# Patient Record
Sex: MALE | Race: BLACK OR AFRICAN AMERICAN | NOT HISPANIC OR LATINO | Employment: FULL TIME | URBAN - METROPOLITAN AREA
[De-identification: names, ages, dates, MRNs, and addresses within clinical notes are randomized per-mention and may not be internally consistent; named-entity substitution may affect disease eponyms.]

---

## 2017-03-21 ENCOUNTER — TELEPHONE (OUTPATIENT)
Dept: OTHER | Facility: CLINIC | Age: 32
End: 2017-03-21

## 2017-03-21 NOTE — TELEPHONE ENCOUNTER
3/21/2017    Call Regarding Onboarding P1 [place]ador    Attempt 1    Message on voicemail     Comments: 0 dep      Outreach   samson

## 2019-05-20 ENCOUNTER — OFFICE VISIT (OUTPATIENT)
Dept: FAMILY MEDICINE | Facility: CLINIC | Age: 34
End: 2019-05-20
Payer: COMMERCIAL

## 2019-05-20 VITALS
OXYGEN SATURATION: 98 % | DIASTOLIC BLOOD PRESSURE: 70 MMHG | HEIGHT: 72 IN | WEIGHT: 247 LBS | HEART RATE: 72 BPM | TEMPERATURE: 98.3 F | BODY MASS INDEX: 33.46 KG/M2 | SYSTOLIC BLOOD PRESSURE: 122 MMHG

## 2019-05-20 DIAGNOSIS — J10.1 INFLUENZA A: ICD-10-CM

## 2019-05-20 DIAGNOSIS — Z11.3 SCREEN FOR STD (SEXUALLY TRANSMITTED DISEASE): ICD-10-CM

## 2019-05-20 DIAGNOSIS — R42 DIZZINESS: Primary | ICD-10-CM

## 2019-05-20 DIAGNOSIS — R53.83 OTHER FATIGUE: ICD-10-CM

## 2019-05-20 LAB
BASOPHILS # BLD AUTO: 0 10E9/L (ref 0–0.2)
BASOPHILS NFR BLD AUTO: 0.6 %
DIFFERENTIAL METHOD BLD: NORMAL
EOSINOPHIL # BLD AUTO: 0.2 10E9/L (ref 0–0.7)
EOSINOPHIL NFR BLD AUTO: 4.1 %
ERYTHROCYTE [DISTWIDTH] IN BLOOD BY AUTOMATED COUNT: 12.3 % (ref 10–15)
HCT VFR BLD AUTO: 42.6 % (ref 40–53)
HGB BLD-MCNC: 14.1 G/DL (ref 13.3–17.7)
LYMPHOCYTES # BLD AUTO: 1.9 10E9/L (ref 0.8–5.3)
LYMPHOCYTES NFR BLD AUTO: 37.8 %
MCH RBC QN AUTO: 27.9 PG (ref 26.5–33)
MCHC RBC AUTO-ENTMCNC: 33.1 G/DL (ref 31.5–36.5)
MCV RBC AUTO: 84 FL (ref 78–100)
MONOCYTES # BLD AUTO: 0.5 10E9/L (ref 0–1.3)
MONOCYTES NFR BLD AUTO: 9.2 %
NEUTROPHILS # BLD AUTO: 2.5 10E9/L (ref 1.6–8.3)
NEUTROPHILS NFR BLD AUTO: 48.3 %
PLATELET # BLD AUTO: 294 10E9/L (ref 150–450)
RBC # BLD AUTO: 5.06 10E12/L (ref 4.4–5.9)
WBC # BLD AUTO: 5.1 10E9/L (ref 4–11)

## 2019-05-20 PROCEDURE — 80053 COMPREHEN METABOLIC PANEL: CPT | Performed by: FAMILY MEDICINE

## 2019-05-20 PROCEDURE — 86780 TREPONEMA PALLIDUM: CPT | Performed by: FAMILY MEDICINE

## 2019-05-20 PROCEDURE — 87389 HIV-1 AG W/HIV-1&-2 AB AG IA: CPT | Performed by: FAMILY MEDICINE

## 2019-05-20 PROCEDURE — 99204 OFFICE O/P NEW MOD 45 MIN: CPT | Performed by: FAMILY MEDICINE

## 2019-05-20 PROCEDURE — 84443 ASSAY THYROID STIM HORMONE: CPT | Performed by: FAMILY MEDICINE

## 2019-05-20 PROCEDURE — 87491 CHLMYD TRACH DNA AMP PROBE: CPT | Performed by: FAMILY MEDICINE

## 2019-05-20 PROCEDURE — 85025 COMPLETE CBC W/AUTO DIFF WBC: CPT | Performed by: FAMILY MEDICINE

## 2019-05-20 PROCEDURE — 36415 COLL VENOUS BLD VENIPUNCTURE: CPT | Performed by: FAMILY MEDICINE

## 2019-05-20 PROCEDURE — 87591 N.GONORRHOEAE DNA AMP PROB: CPT | Performed by: FAMILY MEDICINE

## 2019-05-20 ASSESSMENT — MIFFLIN-ST. JEOR: SCORE: 2109.41

## 2019-05-20 NOTE — PROGRESS NOTES
Subjective     Shashi Yates is a 33 year old male who presents to clinic today for the following health issues:    Diabetes runs in family. Has not been tested in past.     HPI   Dizziness  Onset: weeks ago    Description:   Do you feel faint:  YES, sometimes, not all the times, sometimes he can manage it and it resolves on its own. He has needed to  Lean on a wall to support himself at times.   Does it feel like the surroundings (bed, room) are moving: no   Unsteady/off balance: YES, like hes walking on a boat  Have you passed out or fallen: no     Intensity: moderate    Progression of Symptoms:  worsening and waxing and waning, seemed to get worse after influenza A diagnosis    Accompanying Signs & Symptoms:  Heart palpitations: no   Nausea, vomiting: no   Weakness in arms or legs: no   Fatigue: YES  Vision or speech changes: YES- possibly some slurred speech, he is unsure though. No vision issues.   Ringing in ears (Tinnitus): no   Hearing Loss: no     History:   Head trauma/concussion hx: no, did play football in high school   Previous similar symptoms: no   Recent bleeding history: no     Precipitating factors:   Worse with activity or head movement: no   Any new medications (BP?): no   Alcohol/drug abuse/withdrawal: no     Alleviating factors:   Does staying in a fixed position give relief:  no     Cough and congestion-flu like symptoms     Influenza A -feeling a little bit better  Some cough  Some fatigue  Problems concentrating, slurred , fogginess, hard cont centration   No focal symptoms   No chest pain, no palpitation  No abdominal pain,   pre workouts for work outs-drinking enough    No long flights , no boat ride  Sometimes unsteady  Working at correctional facility  Not feeling like himself today    No excessive thurst  No   No drug, no smokes  No new meds  No new workouts      Patient Active Problem List   Diagnosis     Primary localized osteoarthrosis, lower leg     Iliotibial band syndrome      Lipscomb's disease     Knee pain     Left knee pain     Aftercare following surgery of the musculoskeletal system     Past Surgical History:   Procedure Laterality Date     ARTHROSCOPY KNEE  6/14/2013    Procedure: ARTHROSCOPY KNEE;  Left Knee arthroscopy, partial lateral menisectomy, Open Hardware Removal  ;  Surgeon: Tiburcio Winkler MD;  [facility]     ARTHROSCOPY KNEE Left 10/28/2015    Procedure: ARTHROSCOPY KNEE;  Surgeon: Tiburcio Winkler MD;  [facility]     KNEE SURGERY       ORTHOPEDIC SURGERY  L knee     OSTEOTOMY TIBIA Left 10/28/2015    Procedure: OSTEOTOMY TIBIA;  Surgeon: Tiburcio Winkler MD;  [facility]     REMOVE HARDWARE KNEE  6/14/2013    Procedure: REMOVE HARDWARE KNEE;;  Surgeon: Tiburcio Winkler MD;  [facility]       Social History     Tobacco Use     Smoking status: Never Smoker     Smokeless tobacco: Never Used   Substance Use Topics     Alcohol use: No     Family History   Problem Relation Age of Onset     Diabetes Father      Asthma Mother            Reviewed and updated as needed this visit by Provider         Review of Systems   ROS COMP: Constitutional, HEENT, cardiovascular, pulmonary, GI, , musculoskeletal, neuro, skin, endocrine and psych systems are negative, except as otherwise noted.      Objective    There were no vitals taken for this visit.  There is no height or weight on file to calculate BMI.  Physical Exam   GENERAL: healthy, alert and no distress  EYES: Eyes grossly normal to inspection, PERRL and conjunctivae and sclerae normal  HENT: ear canals and TM's normal, nose and mouth without ulcers or lesions  NECK: no adenopathy, no asymmetry, masses, or scars and thyroid normal to palpation  RESP: lungs clear to auscultation - no rales, rhonchi or wheezes  CV: regular rate and rhythm, normal S1 S2, no S3 or S4, no murmur, click or rub, no peripheral edema and peripheral pulses strong  ABDOMEN: soft, nontender, no hepatosplenomegaly, no  masses and bowel sounds normal  MS: no gross musculoskeletal defects noted, no edema  SKIN: no suspicious lesions or rashes  NEURO: Normal strength and tone, mentation intact and speech normal  PSYCH: mentation appears normal, affect normal/bright    Diagnostic Test Results:  Results for orders placed or performed in visit on 05/20/19 (from the past 24 hour(s))   CBC with platelets and differential   Result Value Ref Range    WBC 5.1 4.0 - 11.0 10e9/L    RBC Count 5.06 4.4 - 5.9 10e12/L    Hemoglobin 14.1 13.3 - 17.7 g/dL    Hematocrit 42.6 40.0 - 53.0 %    MCV 84 78 - 100 fl    MCH 27.9 26.5 - 33.0 pg    MCHC 33.1 31.5 - 36.5 g/dL    RDW 12.3 10.0 - 15.0 %    Platelet Count 294 150 - 450 10e9/L    % Neutrophils 48.3 %    % Lymphocytes 37.8 %    % Monocytes 9.2 %    % Eosinophils 4.1 %    % Basophils 0.6 %    Absolute Neutrophil 2.5 1.6 - 8.3 10e9/L    Absolute Lymphocytes 1.9 0.8 - 5.3 10e9/L    Absolute Monocytes 0.5 0.0 - 1.3 10e9/L    Absolute Eosinophils 0.2 0.0 - 0.7 10e9/L    Absolute Basophils 0.0 0.0 - 0.2 10e9/L    Diff Method Automated Method            Assessment & Plan       ICD-10-CM    1. Dizziness R42 CBC with platelets and differential     Comprehensive metabolic panel     TSH with free T4 reflex   2. Other fatigue R53.83 CBC with platelets and differential     Comprehensive metabolic panel     TSH with free T4 reflex   3. Screen for STD (sexually transmitted disease) Z11.3 HIV Antigen Antibody Combo     Treponema Abs w Reflex to RPR and Titer     Chlamydia trachomatis PCR     Neisseria gonorrhoeae PCR   4. Influenza A J10.1    s/p influenza diagnosis, improving except dizziness. stable  Sounds like BPV-Awaiting other labs  Use saline spray, claritin, use Gatorade   Increase hydration  Avoid rapid movement  Follow up next week if persisting or worsening  Do some of the exercises       BMI:   Estimated body mass index is 33.73 kg/m  as calculated from the following:    Height as of this encounter:  "1.822 m (5' 11.75\").    Weight as of this encounter: 112 kg (247 lb).   Weight management plan: Patient was referred to their PCP to discuss a diet and exercise plan.        See Patient Instructions    No follow-ups on file.    Sonu Quiroz DO  [facility]    "

## 2019-05-20 NOTE — LETTER
May 20, 2019      Shashi SUNITHA Yates  [address]  [facility] [address]        To Whom It May Concern:    Shashi Yates  was seen on May 20, 2019 for the first time in our clinic with Dizziness after influenza diagnosis.  Work up started today, initial labs are normal, advised slow changes in positions, increase hydration, if dizziness persists advised sitting while at work, and follow up with a provider in one week. .        Sincerely,          Sonu Quiroz DO

## 2019-05-20 NOTE — PATIENT INSTRUCTIONS
Your initial labs are stable  Awaiting other labs  Use saline spray, claritin, use Gatorade   Increase hydration  Avoid rapid movement  Follow up next week if persisting or worsening  Do some of the exercises  Sonu Quiroz D.O.    Patient Education     Benign Paroxysmal Positional Vertigo     Your health care provider may move your head in certain ways to treat your BPPV.     Benign paroxysmal positional vertigo (BPPV) is a problem with the inner ear. The inner ear contains the vestibular system. This system is what helps you keep your balance. BPPV causes a feeling of spinning. It is a common problem of the vestibular system.  Understanding the vestibular system  The vestibular system of the ear is made up of very tiny parts. They include the utricle, saccule, and semicircular canals. The utricle is a tiny organ that contains calcium crystals. In some people, the crystals can move into the semicircular canals. When this happens, the system no longer works as it should. This causes BPPV. Benign means it is not life threatening. Paroxysmal means it happens suddenly. Positional means that it happens when you move your head. Vertigo is a feeling of spinning.  What causes BPPV?  Causes include injury to your head or neck. Other problems with the vestibular system may cause BPPV. In many people, the cause of BPPV is not known.  Symptoms of BPPV  You many have repeated feelings of spinning (vertigo). The vertigo usually lasts less than 1 minute. Some movements, such as rolling over in bed, can bring on vertigo.  Diagnosing BPPV  Your primary healthcare provider may diagnose and treat your BPPV. Or you may see an ear, nose, and throat doctor (otolaryngologist). In some cases, you may see a nervous system doctor (neurologist).  The healthcare provider will ask about your symptoms and your medical history. He or she will examine you. You may have hearing and balance tests. As part of the exam, your healthcare provider may  have you move your head and body in certain ways. If you have BPPV, the movements can bring on vertigo. Your provider will also look for abnormal movements of your eyes. You may have other tests to check your vestibular or nervous systems.  Treatment for BPPV  Your healthcare provider may try to move the calcium crystals. This is done by having you move your head and neck in certain ways. This treatment is safe and often works well. You may also be told to do these movements at home. You may still have vertigo for a few weeks. Your healthcare provider will recheck your symptoms, usually in about a month. Special physical therapy may also be part of treatment. In rare cases, surgery may be needed for BPPV that does not go away.     When to call the healthcare provider  Call your healthcare provider right away if you have any of these:    Symptoms that do not go away with treatment    Symptoms that get worse    New symptoms   Date Last Reviewed: 5/1/2017 2000-2018 [employer]. [address] [place] [address], [place] [address]. All rights reserved. This information is not intended as a substitute for professional medical care. Always follow your healthcare professional's instructions.

## 2019-05-20 NOTE — LETTER
[address] [address] [phone]                                                                                                May 28, 2019    Shashi Yates  [address]  [facility] [address]        Dear Mr. Yates,    Your recent lab results were within normal limits. A copy of those results are included with this letter.        Sincerely,      Sonu Quiroz DO/hl    Results for orders placed or performed in visit on 05/20/19   CBC with platelets and differential   Result Value Ref Range    WBC 5.1 4.0 - 11.0 10e9/L    RBC Count 5.06 4.4 - 5.9 10e12/L    Hemoglobin 14.1 13.3 - 17.7 g/dL    Hematocrit 42.6 40.0 - 53.0 %    MCV 84 78 - 100 fl    MCH 27.9 26.5 - 33.0 pg    MCHC 33.1 31.5 - 36.5 g/dL    RDW 12.3 10.0 - 15.0 %    Platelet Count 294 150 - 450 10e9/L    % Neutrophils 48.3 %    % Lymphocytes 37.8 %    % Monocytes 9.2 %    % Eosinophils 4.1 %    % Basophils 0.6 %    Absolute Neutrophil 2.5 1.6 - 8.3 10e9/L    Absolute Lymphocytes 1.9 0.8 - 5.3 10e9/L    Absolute Monocytes 0.5 0.0 - 1.3 10e9/L    Absolute Eosinophils 0.2 0.0 - 0.7 10e9/L    Absolute Basophils 0.0 0.0 - 0.2 10e9/L    Diff Method Automated Method    Comprehensive metabolic panel   Result Value Ref Range    Sodium 140 133 - 144 mmol/L    Potassium 4.6 3.4 - 5.3 mmol/L    Chloride 104 94 - 109 mmol/L    Carbon Dioxide 30 20 - 32 mmol/L    Anion Gap 6 3 - 14 mmol/L    Glucose 100 (H) 70 - 99 mg/dL    Urea Nitrogen 13 7 - 30 mg/dL    Creatinine 0.94 0.66 - 1.25 mg/dL    GFR Estimate >90 >60 mL/min/[1.73_m2]    GFR Estimate If Black >90 >60 mL/min/[1.73_m2]    Calcium 9.0 8.5 - 10.1 mg/dL    Bilirubin Total 0.4 0.2 - 1.3 mg/dL    Albumin 3.8 3.4 - 5.0 g/dL    Protein Total 7.3 6.8 - 8.8 g/dL    Alkaline Phosphatase 65 40 - 150 U/L    ALT 60 0 - 70 U/L    AST 36 0 - 45 U/L   TSH with free T4 reflex   Result Value Ref Range    TSH 1.25 0.40 - 4.00 mU/L   HIV Antigen Antibody  Combo   Result Value Ref Range    HIV Antigen Antibody Combo Nonreactive NR^Nonreactive       Treponema Abs w Reflex to RPR and Titer   Result Value Ref Range    Treponema Antibodies Nonreactive NR^Nonreactive   Chlamydia trachomatis PCR   Result Value Ref Range    Specimen Description Urine     Chlamydia Trachomatis PCR Negative NEG^Negative   Neisseria gonorrhoeae PCR   Result Value Ref Range    Specimen Descrip Urine     N Gonorrhea PCR Negative NEG^Negative

## 2019-05-21 LAB
ALBUMIN SERPL-MCNC: 3.8 G/DL (ref 3.4–5)
ALP SERPL-CCNC: 65 U/L (ref 40–150)
ALT SERPL W P-5'-P-CCNC: 60 U/L (ref 0–70)
ANION GAP SERPL CALCULATED.3IONS-SCNC: 6 MMOL/L (ref 3–14)
AST SERPL W P-5'-P-CCNC: 36 U/L (ref 0–45)
BILIRUB SERPL-MCNC: 0.4 MG/DL (ref 0.2–1.3)
BUN SERPL-MCNC: 13 MG/DL (ref 7–30)
CALCIUM SERPL-MCNC: 9 MG/DL (ref 8.5–10.1)
CHLORIDE SERPL-SCNC: 104 MMOL/L (ref 94–109)
CO2 SERPL-SCNC: 30 MMOL/L (ref 20–32)
CREAT SERPL-MCNC: 0.94 MG/DL (ref 0.66–1.25)
GFR SERPL CREATININE-BSD FRML MDRD: >90 ML/MIN/{1.73_M2}
GLUCOSE SERPL-MCNC: 100 MG/DL (ref 70–99)
HIV 1+2 AB+HIV1 P24 AG SERPL QL IA: NONREACTIVE
POTASSIUM SERPL-SCNC: 4.6 MMOL/L (ref 3.4–5.3)
PROT SERPL-MCNC: 7.3 G/DL (ref 6.8–8.8)
SODIUM SERPL-SCNC: 140 MMOL/L (ref 133–144)
TSH SERPL DL<=0.005 MIU/L-ACNC: 1.25 MU/L (ref 0.4–4)

## 2019-05-22 LAB — T PALLIDUM AB SER QL: NONREACTIVE

## 2019-05-23 LAB
C TRACH DNA SPEC QL NAA+PROBE: NEGATIVE
N GONORRHOEA DNA SPEC QL NAA+PROBE: NEGATIVE
SPECIMEN SOURCE: NORMAL
SPECIMEN SOURCE: NORMAL

## 2019-08-09 ENCOUNTER — OFFICE VISIT (OUTPATIENT)
Dept: FAMILY MEDICINE | Facility: CLINIC | Age: 34
End: 2019-08-09
Payer: COMMERCIAL

## 2019-08-09 VITALS
RESPIRATION RATE: 22 BRPM | OXYGEN SATURATION: 95 % | BODY MASS INDEX: 33.97 KG/M2 | SYSTOLIC BLOOD PRESSURE: 124 MMHG | WEIGHT: 250.8 LBS | HEART RATE: 91 BPM | HEIGHT: 72 IN | TEMPERATURE: 99 F | DIASTOLIC BLOOD PRESSURE: 60 MMHG

## 2019-08-09 DIAGNOSIS — M25.572 PAIN IN JOINT INVOLVING ANKLE AND FOOT, LEFT: Primary | ICD-10-CM

## 2019-08-09 DIAGNOSIS — N34.2 URETHRITIS: ICD-10-CM

## 2019-08-09 DIAGNOSIS — Z72.51 UNPROTECTED SEXUAL INTERCOURSE: ICD-10-CM

## 2019-08-09 DIAGNOSIS — Z11.3 SCREENING EXAMINATION FOR VENEREAL DISEASE: ICD-10-CM

## 2019-08-09 PROCEDURE — 87491 CHLMYD TRACH DNA AMP PROBE: CPT | Performed by: NURSE PRACTITIONER

## 2019-08-09 PROCEDURE — 87591 N.GONORRHOEAE DNA AMP PROB: CPT | Performed by: NURSE PRACTITIONER

## 2019-08-09 PROCEDURE — 96372 THER/PROPH/DIAG INJ SC/IM: CPT | Performed by: NURSE PRACTITIONER

## 2019-08-09 PROCEDURE — 99214 OFFICE O/P EST MOD 30 MIN: CPT | Mod: 25 | Performed by: NURSE PRACTITIONER

## 2019-08-09 RX ORDER — CEFTRIAXONE SODIUM 250 MG/1
250 INJECTION, POWDER, FOR SOLUTION INTRAMUSCULAR; INTRAVENOUS ONCE
Qty: 2.5 ML | Refills: 0 | Status: CANCELLED | OUTPATIENT
Start: 2019-08-09 | End: 2019-08-09

## 2019-08-09 RX ORDER — HYDROCODONE BITARTRATE AND ACETAMINOPHEN 5; 325 MG/1; MG/1
1-2 TABLET ORAL EVERY 12 HOURS PRN
Qty: 40 TABLET | Refills: 0 | Status: CANCELLED | OUTPATIENT
Start: 2019-08-09

## 2019-08-09 RX ORDER — AZITHROMYCIN 500 MG/1
1000 TABLET, FILM COATED ORAL DAILY
Qty: 2 TABLET | Refills: 0 | Status: SHIPPED | OUTPATIENT
Start: 2019-08-09 | End: 2020-01-13

## 2019-08-09 RX ORDER — HYDROCODONE BITARTRATE AND ACETAMINOPHEN 5; 325 MG/1; MG/1
1-2 TABLET ORAL EVERY 12 HOURS PRN
Qty: 12 TABLET | Refills: 0 | Status: SHIPPED | OUTPATIENT
Start: 2019-08-09 | End: 2020-01-13

## 2019-08-09 RX ORDER — CEFTRIAXONE SODIUM 250 MG
250 VIAL (EA) INJECTION ONCE
Status: COMPLETED | OUTPATIENT
Start: 2019-08-09 | End: 2019-08-09

## 2019-08-09 RX ADMIN — Medication 250 MG: at 12:10

## 2019-08-09 ASSESSMENT — MIFFLIN-ST. JEOR: SCORE: 2117.68

## 2019-08-09 ASSESSMENT — PAIN SCALES - GENERAL: PAINLEVEL: SEVERE PAIN (6)

## 2019-08-09 NOTE — PROGRESS NOTES
Subjective     Shashi Yates is a 34 year old male who presents to clinic today for the following health issues:    HPI   Left Ankle Pain    Onset: 1 month, gradually getting worse     Description:   Location: left ankle, left knee and right knee  Character: Dull ache    Intensity: moderate    Progression of Symptoms: same    Accompanying Signs & Symptoms:  Other symptoms: goes to ankle    History:   Previous similar pain: YES- has had surgery a few years ago       Precipitating factors:   Trauma or overuse: YES- overuse, work and sports     Alleviating factors:  Improved by: heat, ice, stretching, chiropractor and tylenol, can't take Ibuprofen due to alllergy  Takes two 325 mg tylenol without improvement    Therapies Tried and outcome: meds and stretching     Left ankle bothering him lately and this is his main concern today.  On his feet a lot.  Works in corrections- always walking and standing.  No known injury to the left ankle that he knows of.  Stays very active jumping when working out.    Chiropractor helped.    In childhood surgery to correct the bow leg.    Had unprotected sex 4 days ago.  How is having tingling with.  No discharge.  Worried about STD and would like treatment.  Unknown if this last partner had any infection.    Patient Active Problem List   Diagnosis     Primary localized osteoarthrosis, lower leg     Iliotibial band syndrome     Meenakshi's disease     Knee pain     Left knee pain     Aftercare following surgery of the musculoskeletal system     Past Surgical History:   Procedure Laterality Date     ARTHROSCOPY KNEE  6/14/2013    Procedure: ARTHROSCOPY KNEE;  Left Knee arthroscopy, partial lateral menisectomy, Open Hardware Removal  ;  Surgeon: Tiburcio Winkler MD;  [facility]     ARTHROSCOPY KNEE Left 10/28/2015    Procedure: ARTHROSCOPY KNEE;  Surgeon: Tiburcio Winkler MD;  [facility]     KNEE SURGERY       KNEE SURGERY  2003    surgery to correct bow leg      "ORTHOPEDIC SURGERY  L knee     OSTEOTOMY TIBIA Left 10/28/2015    Procedure: OSTEOTOMY TIBIA;  Surgeon: Tiburcio Winkler MD;  [facility]     REMOVE HARDWARE KNEE  6/14/2013    Procedure: REMOVE HARDWARE KNEE;;  Surgeon: Tiburcio Winkler MD;  [facility]       Social History     Tobacco Use     Smoking status: Never Smoker     Smokeless tobacco: Never Used   Substance Use Topics     Alcohol use: No     Family History   Problem Relation Age of Onset     Diabetes Father      Asthma Mother          Current Outpatient Medications   Medication Sig Dispense Refill     CLARITIN 10 MG OR TABS ONE DAILY 31 3     DULERA 200-5 MCG/ACT oral inhaler 2 Inhalers  11     FLUoxetine HCl (PROZAC PO) Take 40 mg by mouth daily       fluticasone (FLONASE) 50 MCG/ACT nasal spray 2 sprays by Both Nostrils route daily.              PROAIR  (90 BASE) MCG/ACT IN AERS INHALE 1 TO 2 PUFFS EVERY 4 TO 6 HOURS AS NEEDED 1 1 YEAR     traZODone (DESYREL) 50 MG tablet 50 mg as needed  3     Allergies   Allergen Reactions     Nsaids Nausea     Other reaction(s): Nausea Only  Abdominal pain, causes redness in both eyes  Abdominal pain, causes redness in both eyes       BP Readings from Last 3 Encounters:   08/09/19 124/60   05/20/19 122/70   10/31/15 121/68    Wt Readings from Last 3 Encounters:   08/09/19 113.8 kg (250 lb 12.8 oz)   05/20/19 112 kg (247 lb)   12/04/15 114.8 kg (253 lb)                    Reviewed and updated as needed this visit by Provider  Tobacco  Allergies  Meds  Problems  Med Hx  Surg Hx  Fam Hx         Review of Systems   ROS COMP: Constitutional, HEENT, cardiovascular, pulmonary, musculoskeletal, Gi and gu systems are negative, except as otherwise noted.      Objective    /60 (BP Location: Right arm, Patient Position: Chair, Cuff Size: Adult Large)   Pulse 91   Temp 99  F (37.2  C) (Oral)   Resp 22   Ht 1.816 m (5' 11.5\")   Wt 113.8 kg (250 lb 12.8 oz)   SpO2 95%   BMI 34.49 kg/m  " "  Body mass index is 34.49 kg/m .  Physical Exam   GENERAL: healthy, alert, no distress and obese  MS: Tenderness to the left ankle lateral side, painful range of motion  PSYCH: mentation appears normal, affect normal/bright          Assessment & Plan     1. Pain in joint involving ankle and foot, left  Patient declines x-ray today.  And plans to reach out to his previous orthopedist for follow-up.  Referral placed.  - ORTHOPEDICS ADULT REFERRAL  - Patient not able to take NSAIDs due to allergy.  Ok to use sparingly but should not be used for long term- HYDROcodone-acetaminophen (NORCO) 5-325 MG tablet; Take 1-2 tablets by mouth every 12 hours as needed for moderate to severe pain  Dispense: 12 tablet; Refill: 0  Patient was looked up in [facility] and there are not indications for concern regarding use of controlled substances on record at this time.    2. Urethritis    - cefTRIAXone (ROCEPHIN) injection 250 mg  - Chlamydia trachomatis PCR  - Neisseria gonorrhoeae PCR  - azithromycin (ZITHROMAX) 500 MG tablet; Take 2 tablets (1,000 mg) by mouth daily for 1 day  Dispense: 2 tablet; Refill: 0    3. Unprotected sexual intercourse  Discussed using barrier protection consistently.    4. Screening examination for venereal disease    - Chlamydia trachomatis PCR  - Neisseria gonorrhoeae PCR     BMI:   Estimated body mass index is 34.49 kg/m  as calculated from the following:    Height as of this encounter: 1.816 m (5' 11.5\").    Weight as of this encounter: 113.8 kg (250 lb 12.8 oz).             Return in about 4 weeks (around 9/6/2019) for Physical Exam with PCP.    Arielle Johnson NP  [facility]      "

## 2019-08-09 NOTE — NURSING NOTE
The following medication was given:     MEDICATION: Rocephin 250mgmg and Lidocaine 0.9cc  ROUTE: IM  SITE: Deltoid - Right  DOSE: 1ml  LOT #: [id number], [id number]  :  [employer]  EXPIRATION DATE:  6/2021/ 6/2020  NDC#: 4998-0007-45/8526-3903-89    Given at 12:10pm, per RONALD Rosario    Given by Dayana Ventura CMA

## 2019-08-09 NOTE — PATIENT INSTRUCTIONS
[facility]     Discharged by : Dayana Ventura CMA  Paper scripts provided to patient : yes     If you have any questions regarding your visit please contact your care team:     Team Julia              Clinic Hours Telephone Number     Dr. Trey Johnson, CNP   7am-7pm  Monday - Thursday   7am-5pm  Fridays  [phone]   (Appointment scheduling available 24/7)     RN Line  [phone] option 2     Urgent Care - [place] - 11am-9pm Monday-Friday Saturday-Sunday- 9am-5pm     [place] -   5pm-9pm Monday-Friday Saturday-Sunday- 9am-5pm    [phone] - [facility] Mcdaniels    [phone] - [place]     For a Price Quote for your services, please call our [employer] Price Line at [phone].     What options do I have for visits at the clinic other than the traditional office visit?     To expand how we care for you, many of our providers are utilizing electronic visits (e-visits) and telephone visits, when medically appropriate, for interactions with their patients rather than a visit in the clinic. We also offer nurse visits for many medical concerns. Just like any other service, we will bill your insurance company for this type of visit based on time spent on the phone with your provider. Not all insurance companies cover these visits. Please check with your medical insurance if this type of visit is covered. You will be responsible for any charges that are not paid by your insurance.     E-visits via [facility]: generally incur a $45.00 fee.     Telephone visits:  Time spent on the phone: *charged based on time that is spent on the phone in increments of 10 minutes. Estimated cost:   5-10 mins $30.00   11-20 mins. $59.00   21-30 mins. $85.00       Use [facility] (secure email communication and access to your chart) to send your primary care provider a message or make an appointment. Ask someone on your Team how to sign up for  [facility].     As always, Thank you for trusting us with your health care needs!      [facility]:    Scheduling Appointments  Yamil Hooper [facility]  Call: [phone]    Ludwig Bush [facility]  Call: [phone]    [facility]  Call: [phone]    For Gastroenterology referrals   [facility]   [facility], 4th Floor   [address] [place] [address]   Appointments: [phone]    WHERE TO GO FOR CARE?    Clinic    Make an appointment if you:       Are sick (cold, cough, flu, sore throat, earache or in pain).       Have a small injury (sprain, small cut, burn or broken bone).       Need a physical exam, Pap smear, vaccine or prescription refill.       Have questions about your health or medicines.    To reach us:      Call [phone] ([phone]). Open 24 hours every day. (For counseling services, call [phone].)    Log into [facility] at [email or web address]. (Visit [email or web address] to create an account.) Hospital emergency room    An emergency is a serious or life- threatening problem that must be treated right away.    Call 795 or get to the hospital if you have:      Very bad or sudden:            - Chest pain or pressure         - Bleeding         - Head or belly pain         - Dizziness or trouble seeing, walking or                          Speaking      Problems breathing      Blood in your vomit or you are coughing up blood      A major injury (knocked out, loss of a finger or limb, rape, broken bone protruding from skin)    A mental health crisis. (Or call the Mental Health Crisis line at 1-604.196.1496 or Suicide Prevention Hotline at 1-436.301.1724.)    Open 24 hours every day. You don't need an appointment.     Urgent care    Visit urgent care for sickness or small injuries when the clinic is closed. You don't need an appointment. To check hours or find an urgent care near you,  visit [email or web address]. Online care    Get online care from [facility] for more than 70 common problems, like colds, allergies and infections. Open 24 hours every day at:   [email or web address]   Need help deciding?    For advice about where to be seen, you may call your clinic and ask to speak with a nurse. We're here for you 24 hours every day.         If you are deaf or hard of hearing, please let us know. We provide many free services including sign language interpreters, oral interpreters, TTYs, telephone amplifiers, note takers and written materials.

## 2019-08-10 ENCOUNTER — TELEPHONE (OUTPATIENT)
Dept: ORTHOPEDICS | Facility: CLINIC | Age: 34
End: 2019-08-10

## 2019-08-10 NOTE — TELEPHONE ENCOUNTER
Health Call Center    Phone Message    May a detailed message be left on voicemail: yes    Reason for Call: Question regarding specialist protocol  Please follow protocols- only utilize this documentation for questions or concerns that are not clear in the protocol.  Contact clinic directly to clarify question(s) via phone or [employer] message.   Was Clinic Available: no  Question regarding protocol:  Will Dr. Winkler see pt for Pain in joint involving ankle and foot, left    Is there a referral for the requested specialist/specialty? yes  Name of referring provider: Arielle Johnson NP  [facility] provider[facility]      Action Taken: Message routed to:  [facility] ([facility]

## 2019-08-12 NOTE — TELEPHONE ENCOUNTER
Called and LVM stating that  doesn't seen for foot/ankle issues. Gave him the number for the sports medicine clinic and that any provider can look at his foot/ankle.

## 2019-10-23 ENCOUNTER — OFFICE VISIT (OUTPATIENT)
Dept: FAMILY MEDICINE | Facility: CLINIC | Age: 34
End: 2019-10-23
Payer: COMMERCIAL

## 2019-10-23 VITALS
TEMPERATURE: 98.6 F | SYSTOLIC BLOOD PRESSURE: 110 MMHG | HEART RATE: 60 BPM | BODY MASS INDEX: 34.11 KG/M2 | DIASTOLIC BLOOD PRESSURE: 76 MMHG | OXYGEN SATURATION: 93 % | WEIGHT: 248 LBS

## 2019-10-23 DIAGNOSIS — Z20.2 STD EXPOSURE: ICD-10-CM

## 2019-10-23 DIAGNOSIS — Z23 NEED FOR PROPHYLACTIC VACCINATION AND INOCULATION AGAINST INFLUENZA: Primary | ICD-10-CM

## 2019-10-23 DIAGNOSIS — M25.562 LEFT KNEE PAIN, UNSPECIFIED CHRONICITY: ICD-10-CM

## 2019-10-23 PROCEDURE — 96372 THER/PROPH/DIAG INJ SC/IM: CPT | Performed by: FAMILY MEDICINE

## 2019-10-23 PROCEDURE — 90686 IIV4 VACC NO PRSV 0.5 ML IM: CPT | Performed by: FAMILY MEDICINE

## 2019-10-23 PROCEDURE — 90471 IMMUNIZATION ADMIN: CPT | Performed by: FAMILY MEDICINE

## 2019-10-23 PROCEDURE — 99214 OFFICE O/P EST MOD 30 MIN: CPT | Mod: 25 | Performed by: FAMILY MEDICINE

## 2019-10-23 RX ORDER — CEFTRIAXONE SODIUM 250 MG
250 VIAL (EA) INJECTION ONCE
Status: COMPLETED | OUTPATIENT
Start: 2019-10-23 | End: 2019-10-23

## 2019-10-23 RX ORDER — AZITHROMYCIN 500 MG/1
1000 TABLET, FILM COATED ORAL DAILY
Qty: 2 TABLET | Refills: 0 | Status: SHIPPED | OUTPATIENT
Start: 2019-10-23 | End: 2020-01-13

## 2019-10-23 RX ADMIN — Medication 250 MG: at 10:43

## 2019-10-23 NOTE — PROGRESS NOTES
Subjective     Shashi Yates is a 34 year old male who presents to clinic today for the following health issues:    HPI   Knee problem/pain      Duration: ongoing    Description  Location: left knee    Intensity:  moderate    Accompanying signs and symptoms: none    History  Previous similar problem: YES  Previous evaluation:  Has had previous surgery and imaging done     Precipitating or alleviating factors:  Trauma or overuse: YES- bumped on the wall  Aggravating factors include: walking and climbing stairs    Therapies tried and outcome: acetaminophen    STD exposure:    Patient has unprotected sex almost 3 weeks ago with a woman who told him she may have gonorrhea.  He denies any current concerns or feels some tingling sensation in the penis.  No discharge.  Denies any testicular pain.        Reviewed and updated as needed this visit by Provider         Review of Systems   ROS COMP: Constitutional, HEENT, cardiovascular, pulmonary, gi and gu systems are negative, except as otherwise noted.      Objective    /76   Pulse 60   Temp 98.6  F (37  C) (Tympanic)   Wt 112.5 kg (248 lb)   SpO2 93%   BMI 34.11 kg/m    Body mass index is 34.11 kg/m .  Physical Exam   GENERAL: healthy, alert and no distress  NECK: no adenopathy, no asymmetry, masses, or scars and thyroid normal to palpation  RESP: lungs clear to auscultation - no rales, rhonchi or wheezes  CV: regular rate and rhythm, normal S1 S2, no S3 or S4, no murmur, click or rub, no peripheral edema and peripheral pulses strong  Knee exam done.  There is no swelling.  Range of motion is not limited.  Noted to have some previous scars from the surgery.  No joint line tenderness.          Assessment & Plan     1. Need for prophylactic vaccination and inoculation against influenza    - INFLUENZA VACCINE IM > 6 MONTHS VALENT IIV4 [57757]  - Vaccine Administration, Initial [55468]    2. STD exposure  Patient has known exposure to gonorrhea however he does not  "have any symptoms we will treat him prophylactically with IM shot of Rocephin along with azithromycin to cover for chlamydia as well.  - azithromycin (ZITHROMAX) 500 MG tablet; Take 2 tablets (1,000 mg) by mouth daily for 1 day  Dispense: 2 tablet; Refill: 0  -250mg of Rocephin IM.  3. Left knee pain, unspecified chronicity  Left knee pain most likely contusion.  His exam is normal.  He is advised range of motion exercises icing and use Tylenol as needed.  If symptoms does not improve he is advised to see orthopedic for further evaluation.       BMI:   Estimated body mass index is 34.11 kg/m  as calculated from the following:    Height as of 8/9/19: 1.816 m (5' 11.5\").    Weight as of this encounter: 112.5 kg (248 lb).     Eric Head MD  [facility]      "

## 2019-10-23 NOTE — PROGRESS NOTES
"  SUBJECTIVE:   CC: Shashi Yates is an 34 year old male who presents for preventive health visit.     Healthy Habits:    Do you get at least three servings of calcium containing foods daily (dairy, green leafy vegetables, etc.)? { :709662::\"yes\"}    Amount of exercise or daily activities, outside of work: { :315448}    Problems taking medications regularly { :351597::\"No\"}    Medication side effects: { :769402::\"No\"}    Have you had an eye exam in the past two years? { :878479}    Do you see a dentist twice per year? { :092953}    Do you have sleep apnea, excessive snoring or daytime drowsiness?{ :416096}  {Outside tests to abstract? :015914}    {additional problems to add (Optional):835552}    Today's PHQ-2 Score:   PHQ-2 ( 1999 Pfizer) 8/9/2019   Q1: Little interest or pleasure in doing things 0   Q2: Feeling down, depressed or hopeless 0   PHQ-2 Score 0     {PHQ-2 LOOK IN ASSESSMENTS (Optional) :771431}  Abuse: Current or Past(Physical, Sexual or Emotional)- {YES/NO/NA:447854}  Do you feel safe in your environment? {YES/NO/NA:741516}    Social History     Tobacco Use     Smoking status: Never Smoker     Smokeless tobacco: Never Used   Substance Use Topics     Alcohol use: No     If you drink alcohol do you typically have >3 drinks per day or >7 drinks per week? {ETOH :967839}                      Last PSA: No results found for: PSA    Reviewed orders with patient. Reviewed health maintenance and updated orders accordingly - {Yes/No:443499::\"Yes\"}  {Chronicprobdata (Optional):786507}    Reviewed and updated as needed this visit by clinical staff         Reviewed and updated as needed this visit by Provider        {HISTORY OPTIONS (Optional):720446}    ROS:  { :420239::\"CONSTITUTIONAL: NEGATIVE for fever, chills, change in weight\",\"INTEGUMENTARY/SKIN: NEGATIVE for worrisome rashes, moles or lesions\",\"EYES: NEGATIVE for vision changes or irritation\",\"ENT: NEGATIVE for ear, mouth and throat problems\",\"RESP: " "NEGATIVE for significant cough or SOB\",\"CV: NEGATIVE for chest pain, palpitations or peripheral edema\",\"GI: NEGATIVE for nausea, abdominal pain, heartburn, or change in bowel habits\",\" male: negative for dysuria, hematuria, decreased urinary stream, erectile dysfunction, urethral discharge\",\"MUSCULOSKELETAL: NEGATIVE for significant arthralgias or myalgia\",\"NEURO: NEGATIVE for weakness, dizziness or paresthesias\",\"PSYCHIATRIC: NEGATIVE for changes in mood or affect\"}    OBJECTIVE:   There were no vitals taken for this visit.  EXAM:  {Exam Choices:330382}    {Diagnostic Test Results (Optional):537642::\"Diagnostic Test Results:\",\"Labs reviewed in Epic\"}    ASSESSMENT/PLAN:   {Diag Picklist:673462}    COUNSELING:  {MALE COUNSELING MESSAGES:452389::\"Reviewed preventive health counseling, as reflected in patient instructions\"}    Estimated body mass index is 34.49 kg/m  as calculated from the following:    Height as of 8/9/19: 1.816 m (5' 11.5\").    Weight as of 8/9/19: 113.8 kg (250 lb 12.8 oz).    {Weight Management Plan (ACO) Complete if BMI is abnormal-  Ages 18-64  BMI >24.9.  Age 65+ with BMI <23 or >30 (Optional):139801}     reports that he has never smoked. He has never used smokeless tobacco.  {Tobacco Cessation -- Complete if patient is a smoker (Optional):983206}    Counseling Resources:  ATP IV Guidelines  Pooled Cohorts Equation Calculator  FRAX Risk Assessment  ICSI Preventive Guidelines  Dietary Guidelines for Americans, 2010  [employer]'s MyPlate  ASA Prophylaxis  Lung CA Screening    Eric Head MD  [facility] [place]  "

## 2019-11-05 ENCOUNTER — HEALTH MAINTENANCE LETTER (OUTPATIENT)
Age: 34
End: 2019-11-05

## 2019-11-26 ENCOUNTER — OFFICE VISIT (OUTPATIENT)
Dept: FAMILY MEDICINE | Facility: CLINIC | Age: 34
End: 2019-11-26
Payer: COMMERCIAL

## 2019-11-26 VITALS
SYSTOLIC BLOOD PRESSURE: 108 MMHG | TEMPERATURE: 97.4 F | WEIGHT: 253 LBS | OXYGEN SATURATION: 97 % | HEIGHT: 72 IN | DIASTOLIC BLOOD PRESSURE: 68 MMHG | HEART RATE: 82 BPM | BODY MASS INDEX: 34.27 KG/M2

## 2019-11-26 DIAGNOSIS — Z13.220 SCREENING FOR LIPID DISORDERS: ICD-10-CM

## 2019-11-26 DIAGNOSIS — R53.83 FATIGUE, UNSPECIFIED TYPE: ICD-10-CM

## 2019-11-26 DIAGNOSIS — F41.1 GENERALIZED ANXIETY DISORDER: ICD-10-CM

## 2019-11-26 DIAGNOSIS — Z00.00 ROUTINE GENERAL MEDICAL EXAMINATION AT A HEALTH CARE FACILITY: Primary | ICD-10-CM

## 2019-11-26 DIAGNOSIS — Z11.3 SCREEN FOR STD (SEXUALLY TRANSMITTED DISEASE): ICD-10-CM

## 2019-11-26 DIAGNOSIS — Z13.1 SCREENING FOR DIABETES MELLITUS: ICD-10-CM

## 2019-11-26 DIAGNOSIS — A74.9 CHLAMYDIA INFECTION: ICD-10-CM

## 2019-11-26 DIAGNOSIS — F51.04 PSYCHOPHYSIOLOGICAL INSOMNIA: ICD-10-CM

## 2019-11-26 LAB
ALBUMIN SERPL-MCNC: 3.7 G/DL (ref 3.4–5)
ALP SERPL-CCNC: 55 U/L (ref 40–150)
ALT SERPL W P-5'-P-CCNC: 54 U/L (ref 0–70)
ANION GAP SERPL CALCULATED.3IONS-SCNC: 7 MMOL/L (ref 3–14)
AST SERPL W P-5'-P-CCNC: 50 U/L (ref 0–45)
BILIRUB SERPL-MCNC: 0.5 MG/DL (ref 0.2–1.3)
BUN SERPL-MCNC: 12 MG/DL (ref 7–30)
CALCIUM SERPL-MCNC: 8.8 MG/DL (ref 8.5–10.1)
CHLORIDE SERPL-SCNC: 108 MMOL/L (ref 94–109)
CHOLEST SERPL-MCNC: 148 MG/DL
CO2 SERPL-SCNC: 25 MMOL/L (ref 20–32)
CREAT SERPL-MCNC: 0.81 MG/DL (ref 0.66–1.25)
ERYTHROCYTE [DISTWIDTH] IN BLOOD BY AUTOMATED COUNT: 12 % (ref 10–15)
GFR SERPL CREATININE-BSD FRML MDRD: >90 ML/MIN/{1.73_M2}
GLUCOSE SERPL-MCNC: 98 MG/DL (ref 70–99)
HCT VFR BLD AUTO: 44.4 % (ref 40–53)
HDLC SERPL-MCNC: 66 MG/DL
HGB BLD-MCNC: 14.8 G/DL (ref 13.3–17.7)
LDLC SERPL CALC-MCNC: 67 MG/DL
MCH RBC QN AUTO: 27.9 PG (ref 26.5–33)
MCHC RBC AUTO-ENTMCNC: 33.3 G/DL (ref 31.5–36.5)
MCV RBC AUTO: 84 FL (ref 78–100)
NONHDLC SERPL-MCNC: 82 MG/DL
PLATELET # BLD AUTO: 224 10E9/L (ref 150–450)
POTASSIUM SERPL-SCNC: 3.9 MMOL/L (ref 3.4–5.3)
PROT SERPL-MCNC: 6.9 G/DL (ref 6.8–8.8)
RBC # BLD AUTO: 5.3 10E12/L (ref 4.4–5.9)
SODIUM SERPL-SCNC: 140 MMOL/L (ref 133–144)
TRIGL SERPL-MCNC: 73 MG/DL
TSH SERPL DL<=0.005 MIU/L-ACNC: 1.16 MU/L (ref 0.4–4)
WBC # BLD AUTO: 4 10E9/L (ref 4–11)

## 2019-11-26 PROCEDURE — 99213 OFFICE O/P EST LOW 20 MIN: CPT | Mod: 25 | Performed by: NURSE PRACTITIONER

## 2019-11-26 PROCEDURE — 86803 HEPATITIS C AB TEST: CPT | Performed by: NURSE PRACTITIONER

## 2019-11-26 PROCEDURE — 82306 VITAMIN D 25 HYDROXY: CPT | Performed by: NURSE PRACTITIONER

## 2019-11-26 PROCEDURE — 87340 HEPATITIS B SURFACE AG IA: CPT | Performed by: NURSE PRACTITIONER

## 2019-11-26 PROCEDURE — 80061 LIPID PANEL: CPT | Performed by: NURSE PRACTITIONER

## 2019-11-26 PROCEDURE — 87389 HIV-1 AG W/HIV-1&-2 AB AG IA: CPT | Performed by: NURSE PRACTITIONER

## 2019-11-26 PROCEDURE — 85027 COMPLETE CBC AUTOMATED: CPT | Performed by: NURSE PRACTITIONER

## 2019-11-26 PROCEDURE — 86780 TREPONEMA PALLIDUM: CPT | Performed by: NURSE PRACTITIONER

## 2019-11-26 PROCEDURE — 84443 ASSAY THYROID STIM HORMONE: CPT | Performed by: NURSE PRACTITIONER

## 2019-11-26 PROCEDURE — 87591 N.GONORRHOEAE DNA AMP PROB: CPT | Performed by: NURSE PRACTITIONER

## 2019-11-26 PROCEDURE — 36415 COLL VENOUS BLD VENIPUNCTURE: CPT | Performed by: NURSE PRACTITIONER

## 2019-11-26 PROCEDURE — 80053 COMPREHEN METABOLIC PANEL: CPT | Performed by: NURSE PRACTITIONER

## 2019-11-26 PROCEDURE — 99395 PREV VISIT EST AGE 18-39: CPT | Performed by: NURSE PRACTITIONER

## 2019-11-26 PROCEDURE — 87491 CHLMYD TRACH DNA AMP PROBE: CPT | Performed by: NURSE PRACTITIONER

## 2019-11-26 RX ORDER — HYDROXYZINE PAMOATE 50 MG/1
50-100 CAPSULE ORAL 3 TIMES DAILY PRN
Qty: 60 CAPSULE | Refills: 3 | Status: SHIPPED | OUTPATIENT
Start: 2019-11-26 | End: 2020-02-04

## 2019-11-26 ASSESSMENT — MIFFLIN-ST. JEOR: SCORE: 2127.66

## 2019-11-26 NOTE — PROGRESS NOTES
3  SUBJECTIVE:   CC: Shashi Yates is an 34 year old male who presents for preventive health visit.     Healthy Habits:    Do you get at least three servings of calcium containing foods daily (dairy, green leafy vegetables, etc.)? yes    Amount of exercise or daily activities, outside of work: 4 day(s) per week    Problems taking medications regularly No    Medication side effects: Yes sleep concerns, Having trouble falling and staying asleep. Pt reporst fatigue in the mornings.     Have you had an eye exam in the past two years? no    Do you see a dentist twice per year? yes    Do you have sleep apnea, excessive snoring or daytime drowsiness?yes, day time drowsiness.     HPI:   Shashi reports that he has been having problems with insomnia, anxiety, and fatigue lately. He is unsure if these are related. He does state that he feels like he is experiencing impacts on his mood related to recent losses. He reportedly lost his Grandmother and Mom within the past few months and feels depressed and anxious more than usual. He does also note difficulty falling and staying asleep despite using trazodone nightly. He states that he is feeling tired at all times as a result of poor sleep. He does want to make sure that this fatigue isn't stemming from an organic cause, however. He denies constitutional symptoms like fever, chills, change in weight, night sweats. He is interested in discussing treatment of his mood symptoms and/or insomnia today.      Today's PHQ-2 Score:   PHQ-2 ( 1999 Pfizer) 11/26/2019 8/9/2019   Q1: Little interest or pleasure in doing things 0 0   Q2: Feeling down, depressed or hopeless 0 0   PHQ-2 Score 0 0     Abuse: Current or Past(Physical, Sexual or Emotional)- No  Do you feel safe in your environment? Yes    Social History     Tobacco Use     Smoking status: Never Smoker     Smokeless tobacco: Never Used   Substance Use Topics     Alcohol use: No     If you drink alcohol do you typically have >3  "drinks per day or >7 drinks per week? No                      Last PSA: No results found for: PSA    Reviewed orders with patient. Reviewed health maintenance and updated orders accordingly - Yes  Lab work is in process    Reviewed and updated as needed this visit by clinical staff  Tobacco  Allergies  Meds  Problems  Med Hx  Surg Hx  Fam Hx  Soc Hx          Reviewed and updated as needed this visit by Provider  Tobacco  Allergies  Meds  Problems  Med Hx  Surg Hx  Fam Hx          ROS:  CONSTITUTIONAL: NEGATIVE for fever, chills, change in weight; See HPI regarding fatigue and insomnia.  INTEGUMENTARY/SKIN: NEGATIVE for worrisome rashes, moles or lesions  EYES: NEGATIVE for vision changes or irritation  ENT: NEGATIVE for ear, mouth and throat problems  RESP: NEGATIVE for significant cough or SOB  CV: NEGATIVE for chest pain, palpitations or peripheral edema  GI: NEGATIVE for nausea, abdominal pain, heartburn, or change in bowel habits   male: negative for dysuria, hematuria, decreased urinary stream, erectile dysfunction, urethral discharge  MUSCULOSKELETAL: NEGATIVE for significant arthralgias or myalgia  NEURO: NEGATIVE for weakness, dizziness or paresthesias  PSYCHIATRIC: See HPI regarding insomnia, anxiety, and low mood    OBJECTIVE:   /68   Pulse 82   Temp 97.4  F (36.3  C) (Tympanic)   Ht 1.816 m (5' 11.5\")   Wt 114.8 kg (253 lb)   SpO2 97%   BMI 34.79 kg/m    EXAM:  GENERAL: healthy, alert and no distress  EYES: Eyes grossly normal to inspection, PERRL and conjunctivae and sclerae normal  HENT: ear canals and TM's normal, nose and mouth without ulcers or lesions  NECK: no adenopathy, no asymmetry, masses, or scars and thyroid normal to palpation  RESP: lungs clear to auscultation - no rales, rhonchi or wheezes  CV: regular rate and rhythm, normal S1 S2, no S3 or S4, no murmur, click or rub, no peripheral edema and peripheral pulses strong  ABDOMEN: soft, nontender, no " hepatosplenomegaly, no masses and bowel sounds normal  MS: no gross musculoskeletal defects noted, no edema  SKIN: no suspicious lesions or rashes  NEURO: Normal strength and tone, mentation intact and speech normal  PSYCH: mentation appears normal, affect normal/bright    Diagnostic Test Results:  Results for orders placed or performed in visit on 11/26/19 (from the past 24 hour(s))   CBC with platelets   Result Value Ref Range    WBC 4.0 4.0 - 11.0 10e9/L    RBC Count 5.30 4.4 - 5.9 10e12/L    Hemoglobin 14.8 13.3 - 17.7 g/dL    Hematocrit 44.4 40.0 - 53.0 %    MCV 84 78 - 100 fl    MCH 27.9 26.5 - 33.0 pg    MCHC 33.3 31.5 - 36.5 g/dL    RDW 12.0 10.0 - 15.0 %    Platelet Count 224 150 - 450 10e9/L       ASSESSMENT/PLAN:   Shashi was seen today for physical.    Diagnoses and all orders for this visit:    Routine general medical examination at a health care facility  Comment: With the exception of insomnia, anxiety, and fatigue, he feels generally-well. See below for planning for acute issues. Will screen STDs and standard screening parameters (BG and lipids) today.     Generalized anxiety disorder  Comment: Discussed various approaches to dealing with this issue. He elects to trial therapy and a prn acute anxiety medication initially. He may want to use a daily medication at some point, but he is not quite ready to commit to this. He agrees to reach out should he find that he decides to try that after using hydroxyzine and attending therapy sessions for awhile. No suicidal ideation, plan, or intent.   -     MENTAL HEALTH REFERRAL  - Adult; Outpatient Treatment; Individual/Couples/Family/Group Therapy/Health Psychology; [facility] [place]: [facility] [phone]; We will contact you to schedule the appointment or please call with any questions  -     hydrOXYzine (VISTARIL) 50 MG capsule; Take 1-2 capsules ( mg) by mouth 3 times daily as needed for anxiety    Psychophysiological insomnia  Comment: I  "suspect that this is driven by anxiety. Ideally, treatment of anxiety with therapy and prn hydroxyzine will assist with this issue. If he fails to note improvement, he will follow up.  -     hydrOXYzine (VISTARIL) 50 MG capsule; Take 1-2 capsules ( mg) by mouth 3 times daily as needed for anxiety    Fatigue, unspecified type  Comment: I suspect that this is driven by depressed mood (recent losses), underlying anxiety, and insomnia. However, I would like to rule out common organic etiologies, as well. Will follow up with him after I have the results of these studies.   -     CBC with platelets  -     TSH with free T4 reflex  -     Vitamin D Deficiency  -     Comprehensive metabolic panel    Screen for STD (sexually transmitted disease)  -     NEISSERIA GONORRHOEA PCR  -     CHLAMYDIA TRACHOMATIS PCR  -     HIV Antigen Antibody Combo  -     Hepatitis B surface antigen  -     Hepatitis C antibody  -     Treponema Abs w Reflex to RPR and Titer    Screening for diabetes mellitus  -     Comprehensive metabolic panel    Screening for lipid disorders  -     Lipid panel reflex to direct LDL Fasting        COUNSELING:  Reviewed preventive health counseling, as reflected in patient instructions    Estimated body mass index is 34.79 kg/m  as calculated from the following:    Height as of this encounter: 1.816 m (5' 11.5\").    Weight as of this encounter: 114.8 kg (253 lb).    Weight management plan: Discussed healthy diet and exercise guidelines     reports that he has never smoked. He has never used smokeless tobacco.      Counseling Resources:  ATP IV Guidelines  Pooled Cohorts Equation Calculator  FRAX Risk Assessment  ICSI Preventive Guidelines  Dietary Guidelines for Americans, 2010  USDA's MyPlate  ASA Prophylaxis  Lung CA Screening    Cesar Gonzalez NP  [facility]  "

## 2019-11-27 LAB
DEPRECATED CALCIDIOL+CALCIFEROL SERPL-MC: 31 UG/L (ref 20–75)
HBV SURFACE AG SERPL QL IA: NONREACTIVE
HCV AB SERPL QL IA: NONREACTIVE
HIV 1+2 AB+HIV1 P24 AG SERPL QL IA: NONREACTIVE
N GONORRHOEA DNA SPEC QL NAA+PROBE: NEGATIVE
SPECIMEN SOURCE: NORMAL
T PALLIDUM AB SER QL: NONREACTIVE

## 2019-11-29 ENCOUNTER — TELEPHONE (OUTPATIENT)
Dept: FAMILY MEDICINE | Facility: CLINIC | Age: 34
End: 2019-11-29

## 2019-11-29 LAB
C TRACH DNA SPEC QL NAA+PROBE: POSITIVE
SPECIMEN SOURCE: ABNORMAL

## 2019-11-29 RX ORDER — DOXYCYCLINE 100 MG/1
100 CAPSULE ORAL 2 TIMES DAILY
Qty: 14 CAPSULE | Refills: 0 | Status: SHIPPED | OUTPATIENT
Start: 2019-11-29 | End: 2020-01-13

## 2019-11-29 NOTE — TELEPHONE ENCOUNTER
Lab brought a positive Chlamydia result.   Dr. Head rx'd Doxy BID x 7 days.  triage will need to call and notify patient of results/prescription and to notify his partners/compelt [facility] form.    Narcisa BETTSRN BSN  [facility]  [phone]

## 2019-11-29 NOTE — TELEPHONE ENCOUNTER
Left message on answering machine for patient to call back.  Sent mychart to call clinic    Narcisa BETTSRN BSN  [facility]  [phone]

## 2019-11-29 NOTE — TELEPHONE ENCOUNTER
patient noticed of test results, prescription and the need to notify all partners. Advised that this is reportable and the states will call him to get his known partners names.  Advised patient to use protection from now on.  patient verbalized understanding.    Narcisa BETTS RN BSN  [facility]  [phone]

## 2019-12-02 NOTE — TELEPHONE ENCOUNTER
Faxed completed MD form to [phone].    Narcisa BETTSRN BSN  [facility]  [phone]

## 2020-01-13 ENCOUNTER — OFFICE VISIT (OUTPATIENT)
Dept: FAMILY MEDICINE | Facility: CLINIC | Age: 35
End: 2020-01-13
Payer: COMMERCIAL

## 2020-01-13 VITALS
DIASTOLIC BLOOD PRESSURE: 68 MMHG | WEIGHT: 248 LBS | HEIGHT: 72 IN | SYSTOLIC BLOOD PRESSURE: 100 MMHG | BODY MASS INDEX: 33.59 KG/M2 | TEMPERATURE: 98.1 F | HEART RATE: 78 BPM

## 2020-01-13 DIAGNOSIS — F41.0 PANIC ATTACK: ICD-10-CM

## 2020-01-13 DIAGNOSIS — F41.1 GENERALIZED ANXIETY DISORDER: Primary | ICD-10-CM

## 2020-01-13 DIAGNOSIS — J34.2 DEVIATED NASAL SEPTUM: ICD-10-CM

## 2020-01-13 DIAGNOSIS — J33.9 NASAL POLYP: ICD-10-CM

## 2020-01-13 DIAGNOSIS — Z01.818 PREOP GENERAL PHYSICAL EXAM: Primary | ICD-10-CM

## 2020-01-13 PROCEDURE — 99214 OFFICE O/P EST MOD 30 MIN: CPT | Performed by: NURSE PRACTITIONER

## 2020-01-13 RX ORDER — ESCITALOPRAM OXALATE 10 MG/1
10 TABLET ORAL DAILY
Qty: 30 TABLET | Refills: 1 | Status: SHIPPED | OUTPATIENT
Start: 2020-01-13 | End: 2020-03-06

## 2020-01-13 RX ORDER — ALPRAZOLAM 0.5 MG
0.5 TABLET ORAL 2 TIMES DAILY PRN
Qty: 16 TABLET | Refills: 0 | Status: SHIPPED | OUTPATIENT
Start: 2020-01-13 | End: 2020-03-06

## 2020-01-13 ASSESSMENT — ANXIETY QUESTIONNAIRES
3. WORRYING TOO MUCH ABOUT DIFFERENT THINGS: MORE THAN HALF THE DAYS
1. FEELING NERVOUS, ANXIOUS, OR ON EDGE: MORE THAN HALF THE DAYS
2. NOT BEING ABLE TO STOP OR CONTROL WORRYING: SEVERAL DAYS
IF YOU CHECKED OFF ANY PROBLEMS ON THIS QUESTIONNAIRE, HOW DIFFICULT HAVE THESE PROBLEMS MADE IT FOR YOU TO DO YOUR WORK, TAKE CARE OF THINGS AT HOME, OR GET ALONG WITH OTHER PEOPLE: SOMEWHAT DIFFICULT
6. BECOMING EASILY ANNOYED OR IRRITABLE: SEVERAL DAYS
5. BEING SO RESTLESS THAT IT IS HARD TO SIT STILL: SEVERAL DAYS
7. FEELING AFRAID AS IF SOMETHING AWFUL MIGHT HAPPEN: SEVERAL DAYS
GAD7 TOTAL SCORE: 9

## 2020-01-13 ASSESSMENT — PATIENT HEALTH QUESTIONNAIRE - PHQ9
5. POOR APPETITE OR OVEREATING: SEVERAL DAYS
SUM OF ALL RESPONSES TO PHQ QUESTIONS 1-9: 3

## 2020-01-13 ASSESSMENT — MIFFLIN-ST. JEOR: SCORE: 2104.98

## 2020-01-14 ASSESSMENT — ANXIETY QUESTIONNAIRES: GAD7 TOTAL SCORE: 9

## 2020-01-14 NOTE — PROGRESS NOTES
Will start Lexapro 10 mg and see in 6 weeks.    Will judiciously use alprazolam for panic attacks.

## 2020-01-14 NOTE — PROGRESS NOTES
[facility]  [address] [address]  [phone]  Dept: [phone]    PRE-OP EVALUATION:  Today's date: 2020    **PREOP FAXED** ALEXANDER Huntercamelia Yates (: 1985) presents for pre-operative evaluation assessment as requested by Dr. You.  He requires evaluation and anesthesia risk assessment prior to undergoing surgery/procedure for treatment of deviated septum and nasal polyps.    Proposed Surgery/ Procedure: SEPTOPLASTY, BILATERAL ABLATION OF TURBINATES, RIGHT EXCISION PRAVEEN BULLOSA, BILATERAL ANTROSTOMY WITH TISSUE REMOVAL, ENDOSCOPIC BILATERAL TOTAL ETHMOIDECTOMY, BILATERAL FRONTAL SINUSOTOMY WITH TISSUE REMOVAL, BILATERAL SPHENOID SINUSOTOMY WITH TISS  Date of Surgery/ Procedure: 19  Time of Surgery/ Procedure: 9:30 am   Hospital/Surgical Facility: [facility] [place]   Fax number for surgical facility: [phone]  Primary Physician: Servando Garcia  Type of Anesthesia Anticipated: General    Patient has a Health Care Directive or Living Will:  NO    1. NO - Do you have a history of heart attack, stroke, stent, bypass or surgery on an artery in the head, neck, heart or legs?  2. NO - Do you ever have any pain or discomfort in your chest?  3. NO - Do you have a history of  Heart Failure?  4. NO - Are you troubled by shortness of breath when: walking on the level, up a slight hill or at night?  5. NO - Do you currently have a cold, bronchitis or other respiratory infection?  6. NO - Do you have a cough, shortness of breath or wheezing?  7. NO - Do you sometimes get pains in the calves of your legs when you walk?  8. NO - Do you or anyone in your family have previous history of blood clots?  9. NO - Do you or does anyone in your family have a serious bleeding problem such as prolonged bleeding following surgeries or cuts?  10. NO - Have you ever had problems with anemia or been told to take iron pills?  11. NO - Have you  had any abnormal blood loss such as black, tarry or bloody stools, or abnormal vaginal bleeding?  12. NO - Have you ever had a blood transfusion?  13. NO - Have you or any of your relatives ever had problems with anesthesia?  14. NO - Do you have sleep apnea, excessive snoring or daytime drowsiness?  15. NO - Do you have any prosthetic heart valves?  16. NO - Do you have prosthetic joints?  17. NO - Is there any chance that you may be pregnant?      HPI:     HPI related to upcoming procedure: Longstanding issues related to nasal polyps and deviated septum. Elective surgery on 1/21.      See problem list for active medical problems.  Problems all longstanding and stable, except as noted/documented.  See ROS for pertinent symptoms related to these conditions.      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     Priority: Medium      Past Medical History:   Diagnosis Date     Allergy      Asthma      PONV (postoperative nausea and vomiting)      Primary localized osteoarthrosis, lower leg 1/27/2012     Unspecified asthma(493.90)      Past Surgical History:   Procedure Laterality Date     ARTHROSCOPY KNEE  6/14/2013    Procedure: ARTHROSCOPY KNEE;  Left Knee arthroscopy, partial lateral menisectomy, Open Hardware Removal  ;  Surgeon: Tiburcio Winkler MD;  [facility]     ARTHROSCOPY KNEE Left 10/28/2015    Procedure: ARTHROSCOPY KNEE;  Surgeon: Tiburcio Winkler MD;  [facility]     KNEE SURGERY       KNEE SURGERY  2003    surgery to correct bow leg     ORTHOPEDIC SURGERY  L knee     OSTEOTOMY TIBIA Left 10/28/2015     Procedure: OSTEOTOMY TIBIA;  Surgeon: Tiburcio Winkler MD;  [facility]     REMOVE HARDWARE KNEE  6/14/2013    Procedure: REMOVE HARDWARE KNEE;;  Surgeon: Tiburcio Winkler MD;  [facility]     Current Outpatient Medications   Medication Sig Dispense Refill     CLARITIN 10 MG OR TABS ONE DAILY 31 3     DULERA 200-5 MCG/ACT oral inhaler 2 Inhalers  11     fluticasone (FLONASE) 50 MCG/ACT nasal spray 2 sprays by Both Nostrils route daily.       PROAIR  (90 BASE) MCG/ACT IN AERS INHALE 1 TO 2 PUFFS EVERY 4 TO 6 HOURS AS NEEDED 1 1 YEAR     traZODone (DESYREL) 50 MG tablet 50 mg as needed  3     FLUoxetine HCl (PROZAC PO) Take 40 mg by mouth daily       hydrOXYzine (VISTARIL) 50 MG capsule Take 1-2 capsules ( mg) by mouth 3 times daily as needed for anxiety (Patient not taking: Reported on 1/13/2020) 60 capsule 3     OTC products: None, except as noted above    Allergies   Allergen Reactions     Nsaids Nausea     Other reaction(s): Nausea Only  Abdominal pain, causes redness in both eyes  Abdominal pain, causes redness in both eyes        Latex Allergy: NO    Social History     Tobacco Use     Smoking status: Never Smoker     Smokeless tobacco: Never Used   Substance Use Topics     Alcohol use: No     History   Drug Use No       REVIEW OF SYSTEMS:   CONSTITUTIONAL: NEGATIVE for fever, chills, change in weight  INTEGUMENTARY/SKIN: NEGATIVE for worrisome rashes, moles or lesions  EYES: NEGATIVE for vision changes or irritation  ENT/MOUTH: NEGATIVE for ear, mouth and throat problems  RESP: NEGATIVE for significant cough or SOB  BREAST: NEGATIVE for masses, tenderness or discharge  CV: NEGATIVE for chest pain, palpitations or peripheral edema  GI: NEGATIVE for nausea, abdominal pain, heartburn, or change in bowel habits  : NEGATIVE for frequency, dysuria, or hematuria  MUSCULOSKELETAL: NEGATIVE for significant arthralgias or myalgia  NEURO: NEGATIVE for weakness, dizziness or  "paresthesias  ENDOCRINE: NEGATIVE for temperature intolerance, skin/hair changes  HEME: NEGATIVE for bleeding problems  PSYCHIATRIC: NEGATIVE for changes in mood or affect    EXAM:   /68 (BP Location: Left arm, Patient Position: Chair, Cuff Size: Adult Large)   Pulse 78   Temp 98.1  F (36.7  C) (Tympanic)   Ht 1.816 m (5' 11.5\")   Wt 112.5 kg (248 lb)   BMI 34.11 kg/m      GENERAL APPEARANCE: healthy, alert and no distress     EYES: EOMI,  PERRL     HENT: ear canals and TM's normal and nose and mouth without ulcers or lesions     NECK: no adenopathy, no asymmetry, masses, or scars and thyroid normal to palpation     RESP: lungs clear to auscultation - no rales, rhonchi or wheezes     CV: regular rates and rhythm, normal S1 S2, no S3 or S4 and no murmur, click or rub     ABDOMEN:  soft, nontender, no HSM or masses and bowel sounds normal     MS: extremities normal- no gross deformities noted, no evidence of inflammation in joints, FROM in all extremities.     SKIN: no suspicious lesions or rashes     NEURO: Normal strength and tone, sensory exam grossly normal, mentation intact and speech normal     PSYCH: mentation appears normal. and affect normal/bright     LYMPHATICS: No cervical adenopathy    DIAGNOSTICS:   EKG: Not indicated due to non-vascular surgery and low risk of event (age <65 and without cardiac risk factors)    Recent Labs   Lab Test 11/26/19  0840 05/20/19  1114   HGB 14.8 14.1    294    140   POTASSIUM 3.9 4.6   CR 0.81 0.94        IMPRESSION:   Reason for surgery/procedure: Deviated septum and nasal polyps  Diagnosis/reason for consult: Preoperative clearance    The proposed surgical procedure is considered INTERMEDIATE risk.    REVISED CARDIAC RISK INDEX  The patient has the following serious cardiovascular risks for perioperative complications such as (MI, PE, VFib and 3  AV Block):  No serious cardiac risks  INTERPRETATION: 0 risks: Class I (very low risk - 0.4% " complication rate)    The patient has the following additional risks for perioperative complications:  No identified additional risks      ICD-10-CM    1. Preop general physical exam Z01.818    2. Nasal polyp J33.9    3. Deviated nasal septum J34.2        RECOMMENDATIONS:     --Patient is to take all scheduled medications on the day of surgery EXCEPT for modifications listed below.    APPROVAL GIVEN to proceed with proposed procedure, without further diagnostic evaluation       Signed Electronically by: Cesar Gonzalez NP    Copy of this evaluation report is provided to requesting physician.    [place] Preop Guidelines    Revised Cardiac Risk Index

## 2020-02-04 ENCOUNTER — OFFICE VISIT (OUTPATIENT)
Dept: FAMILY MEDICINE | Facility: CLINIC | Age: 35
End: 2020-02-04
Payer: COMMERCIAL

## 2020-02-04 VITALS
BODY MASS INDEX: 34.72 KG/M2 | WEIGHT: 248 LBS | HEIGHT: 71 IN | HEART RATE: 94 BPM | SYSTOLIC BLOOD PRESSURE: 108 MMHG | OXYGEN SATURATION: 100 % | TEMPERATURE: 98.6 F | DIASTOLIC BLOOD PRESSURE: 72 MMHG

## 2020-02-04 DIAGNOSIS — J45.30 MILD PERSISTENT ALLERGIC ASTHMA: Primary | ICD-10-CM

## 2020-02-04 DIAGNOSIS — J30.2 SEASONAL ALLERGIC RHINITIS, UNSPECIFIED TRIGGER: ICD-10-CM

## 2020-02-04 DIAGNOSIS — G47.00 INSOMNIA, UNSPECIFIED TYPE: ICD-10-CM

## 2020-02-04 PROCEDURE — 99214 OFFICE O/P EST MOD 30 MIN: CPT | Performed by: FAMILY MEDICINE

## 2020-02-04 RX ORDER — FEXOFENADINE HCL 180 MG/1
180 TABLET ORAL DAILY
Qty: 30 TABLET | Refills: 4 | Status: SHIPPED | OUTPATIENT
Start: 2020-02-04 | End: 2023-12-11

## 2020-02-04 RX ORDER — FLUTICASONE PROPIONATE 50 MCG
2 SPRAY, SUSPENSION (ML) NASAL DAILY
Qty: 15.8 ML | Refills: 4 | Status: SHIPPED | OUTPATIENT
Start: 2020-02-04 | End: 2021-08-02

## 2020-02-04 RX ORDER — TRAZODONE HYDROCHLORIDE 50 MG/1
50 TABLET, FILM COATED ORAL PRN
Qty: 30 TABLET | Refills: 3 | Status: SHIPPED | OUTPATIENT
Start: 2020-02-04 | End: 2020-04-30

## 2020-02-04 RX ORDER — ALBUTEROL SULFATE 90 UG/1
AEROSOL, METERED RESPIRATORY (INHALATION)
Qty: 1 INHALER | Refills: 11 | Status: SHIPPED | OUTPATIENT
Start: 2020-02-04 | End: 2021-08-02

## 2020-02-04 RX ORDER — ALBUTEROL SULFATE 90 UG/1
AEROSOL, METERED RESPIRATORY (INHALATION)
Qty: 1 G | Status: SHIPPED | OUTPATIENT
Start: 2020-02-04 | End: 2020-02-04

## 2020-02-04 ASSESSMENT — MIFFLIN-ST. JEOR: SCORE: 2097.05

## 2020-02-04 NOTE — PROGRESS NOTES
"Subjective     Shashi Yates is a 34 year old male who presents to clinic today for the following health issues:    HPI   Asthma Follow-Up    Was ACT completed today?  Yes    ACT Total Scores 2/4/2020   ACT TOTAL SCORE (Goal Greater than or Equal to 20) 19   In the past 12 months, how many times did you visit the emergency room for your asthma without being admitted to the hospital? 0   In the past 12 months, how many times were you hospitalized overnight because of your asthma? 0       How many days per week do you miss taking your asthma controller medication?  Missed today because he is out of his medication.    Please describe any recent triggers for your asthma: seasonal allergies - its been really bad for the last week or so     Have you had any Emergency Room Visits, Urgent Care Visits, or Hospital Admissions since your last office visit?  No, not in the last 4 months      How many servings of fruits and vegetables do you eat daily?  2-3    On average, how many sweetened beverages do you drink each day (Examples: soda, juice, sweet tea, etc.  Do NOT count diet or artificially sweetened beverages)?   0    How many days per week do you exercise enough to make your heart beat faster? 4    How many minutes a day do you exercise enough to make your heart beat faster? 30 - 60    How many days per week do you miss taking your medication? 0        Issue with sleep use trazodone.      Reviewed and updated as needed this visit by Provider         Review of Systems   ROS COMP: Constitutional, HEENT, cardiovascular, pulmonary, gi and gu systems are negative, except as otherwise noted.      Objective    /72 (BP Location: Right arm, Cuff Size: Adult Large)   Pulse 94   Temp 98.6  F (37  C) (Oral)   Ht 1.803 m (5' 11\")   Wt 112.5 kg (248 lb)   SpO2 100%   BMI 34.59 kg/m    Body mass index is 34.59 kg/m .  Physical Exam   GENERAL: healthy, alert and no distress  NECK: no adenopathy, no asymmetry, masses, or " scars and thyroid normal to palpation  RESP: lungs clear to auscultation - no rales, rhonchi or wheezes  CV: regular rate and rhythm, normal S1 S2, no S3 or S4, no murmur, click or rub, no peripheral edema and peripheral pulses strong  ABDOMEN: soft, nontender, no hepatosplenomegaly, no masses and bowel sounds normal  MS: no gross musculoskeletal defects noted, no edema            Assessment & Plan     1. Mild persistent allergic asthma  Medications refilled.  - mometasone-formoterol (DULERA) 200-5 MCG/ACT inhaler; Inhale 2 puffs into the lungs 2 times daily  Dispense: 1 Inhaler; Refill: 11  - albuterol (PROAIR HFA) 108 (90 Base) MCG/ACT inhaler; INHALE 1 TO 2 PUFFS EVERY 4 TO 6 HOURS AS NEEDED  Dispense: 1 Inhaler; Refill: 11    2. Insomnia, unspecified type  Use as needed.  - traZODone (DESYREL) 50 MG tablet; Take 1 tablet (50 mg) by mouth as needed  Dispense: 30 tablet; Refill: 3    3. Seasonal allergic rhinitis, unspecified trigger    - fluticasone (FLONASE) 50 MCG/ACT nasal spray; Spray 2 sprays into both nostrils daily  Dispense: 15.8 mL; Refill: 4  - fexofenadine (ALLEGRA) 180 MG tablet; Take 1 tablet (180 mg) by mouth daily  Dispense: 30 tablet; Refill: 4       Eric Head MD  [facility] [place]

## 2020-02-05 ASSESSMENT — ASTHMA QUESTIONNAIRES: ACT_TOTALSCORE: 19

## 2020-03-06 ENCOUNTER — OFFICE VISIT (OUTPATIENT)
Dept: FAMILY MEDICINE | Facility: CLINIC | Age: 35
End: 2020-03-06
Payer: COMMERCIAL

## 2020-03-06 VITALS
WEIGHT: 250 LBS | BODY MASS INDEX: 34.87 KG/M2 | TEMPERATURE: 98.2 F | SYSTOLIC BLOOD PRESSURE: 102 MMHG | DIASTOLIC BLOOD PRESSURE: 62 MMHG | HEART RATE: 66 BPM

## 2020-03-06 DIAGNOSIS — F41.0 PANIC ATTACK: ICD-10-CM

## 2020-03-06 DIAGNOSIS — F41.1 GENERALIZED ANXIETY DISORDER: Primary | ICD-10-CM

## 2020-03-06 PROCEDURE — 99214 OFFICE O/P EST MOD 30 MIN: CPT | Performed by: NURSE PRACTITIONER

## 2020-03-06 RX ORDER — ESCITALOPRAM OXALATE 10 MG/1
10 TABLET ORAL DAILY
Qty: 30 TABLET | Refills: 1 | Status: SHIPPED | OUTPATIENT
Start: 2020-03-06 | End: 2020-04-30

## 2020-03-06 RX ORDER — HYDROXYZINE PAMOATE 25 MG/1
25-100 CAPSULE ORAL 3 TIMES DAILY PRN
Qty: 60 CAPSULE | Refills: 1 | Status: SHIPPED | OUTPATIENT
Start: 2020-03-06 | End: 2023-08-09

## 2020-03-06 RX ORDER — ALPRAZOLAM 0.5 MG
.5-1 TABLET ORAL 2 TIMES DAILY PRN
Qty: 20 TABLET | Refills: 0 | Status: SHIPPED | OUTPATIENT
Start: 2020-03-06 | End: 2020-04-30

## 2020-03-06 ASSESSMENT — ANXIETY QUESTIONNAIRES
IF YOU CHECKED OFF ANY PROBLEMS ON THIS QUESTIONNAIRE, HOW DIFFICULT HAVE THESE PROBLEMS MADE IT FOR YOU TO DO YOUR WORK, TAKE CARE OF THINGS AT HOME, OR GET ALONG WITH OTHER PEOPLE: VERY DIFFICULT
GAD7 TOTAL SCORE: 13
3. WORRYING TOO MUCH ABOUT DIFFERENT THINGS: MORE THAN HALF THE DAYS
5. BEING SO RESTLESS THAT IT IS HARD TO SIT STILL: SEVERAL DAYS
6. BECOMING EASILY ANNOYED OR IRRITABLE: SEVERAL DAYS
2. NOT BEING ABLE TO STOP OR CONTROL WORRYING: MORE THAN HALF THE DAYS
7. FEELING AFRAID AS IF SOMETHING AWFUL MIGHT HAPPEN: SEVERAL DAYS
1. FEELING NERVOUS, ANXIOUS, OR ON EDGE: NEARLY EVERY DAY

## 2020-03-06 ASSESSMENT — PATIENT HEALTH QUESTIONNAIRE - PHQ9
SUM OF ALL RESPONSES TO PHQ QUESTIONS 1-9: 11
5. POOR APPETITE OR OVEREATING: NEARLY EVERY DAY

## 2020-03-06 NOTE — PROGRESS NOTES
Subjective     Shashi Yates is a 34 year old male who presents to clinic today for the following health issues:      Abnormal Mood Symptoms  Onset: years, but has worsened in the past 2 months     Description:   Depression: no  Anxiety: YES    Accompanying Signs & Symptoms:  Still participating in activities that you used to enjoy: YES  Fatigue: YES- shift work   Irritability: YES  Difficulty concentrating: YES  Changes in appetite: YES  Problems with sleep: YES  Heart racing/beating fast : YES  Thoughts of hurting yourself or others: none    History:   Recent stress: YES  Prior depression hospitalization: None  Family history of depression: no  Family history of anxiety: YES    Precipitating factors:   Alcohol/drug use: no    Alleviating factors:  Work out     Therapies Tried and outcome: Wellbutrin (Bupropion)    HPI: Shashi presents today for anxiety and panic follow up. He has been having increasingly more frequent panic attacks and is feeling anxious more and more. No depression or thoughts of hurting himself. 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 He also reports waking up anxious every morning. He was prescribed escitalopram in January, but he has not taken this (he was frightened of side effects). Two years ago, he took bupropion for similar issues and noted some relief. Is seeing a work therapist with some benefit noted.      PHQ 1/13/2020 3/6/2020   PHQ-9 Total Score 3 11   Q9: Thoughts of better off dead/self-harm past 2 weeks Not at all Not at all     NIKO-7 SCORE 1/13/2020 3/6/2020   Total Score 9 13       Patient Active Problem List   Diagnosis     Primary localized osteoarthrosis, lower leg     Iliotibial band syndrome     Cumberland's disease     Knee pain     Left knee pain     Aftercare following surgery of the musculoskeletal system     Past Surgical History:   Procedure Laterality Date     ARTHROSCOPY KNEE  6/14/2013    Procedure: ARTHROSCOPY KNEE;  Left Knee  arthroscopy, partial lateral menisectomy, Open Hardware Removal  ;  Surgeon: Tiburcio Winkler MD;  [facility]     ARTHROSCOPY KNEE Left 10/28/2015    Procedure: ARTHROSCOPY KNEE;  Surgeon: Tiburcio Winkler MD;  [facility]     KNEE SURGERY       KNEE SURGERY  2003    surgery to correct bow leg     ORTHOPEDIC SURGERY  L knee     OSTEOTOMY TIBIA Left 10/28/2015    Procedure: OSTEOTOMY TIBIA;  Surgeon: Tiburcio Winkler MD;  [facility]     REMOVE HARDWARE KNEE  6/14/2013    Procedure: REMOVE HARDWARE KNEE;;  Surgeon: Tiburcio Winkler MD;  [facility]       Social History     Tobacco Use     Smoking status: Never Smoker     Smokeless tobacco: Never Used   Substance Use Topics     Alcohol use: No     Family History   Problem Relation Age of Onset     Diabetes Father      Asthma Mother            Reviewed and updated as needed this visit by Provider  Tobacco  Allergies  Meds  Problems  Med Hx  Surg Hx  Fam Hx         Review of Systems   ROS COMP: Constitutional, neuro and psych systems are negative, except as otherwise noted.      Objective    /62 (BP Location: Left arm, Patient Position: Chair, Cuff Size: Adult Large)   Pulse 66   Temp 98.2  F (36.8  C) (Tympanic)   Wt 113.4 kg (250 lb)   BMI 34.87 kg/m    Body mass index is 34.87 kg/m .  Physical Exam   GENERAL: healthy, alert and no distress  NEURO: Normal strength and tone, mentation intact and speech normal  PSYCH: mentation appears normal, affect normal/bright    Diagnostic Test Results:  Labs reviewed in Epic        Assessment & Plan     Shashi was seen today for anxiety. Unfortunately, he did not initiate our prescribed regimen and has been suffering with worsening anxiety and panic. We had a long discussion today about his concerns related to taking escitalopram. We also discussed potentially starting bupropion (as he has been on this in the past for depression and noted some benefit), but, ultimately, we  decided to hold on this given that anxiety and panic are his primary issues and bupropion is not labeled for treatment of anxiety (and has actually worsened existing anxiety in some people). After our discussion, he feels reassured and wishes to try the prescribed escitalopram. We will start at 10 mg daily, and I will see him in 6 weeks. He states that 0.5 mg Xanax did not really help in panic situations, so I changed the dose range to 0.5 mg to 1 mg and refilled this (he understands that this should be used sparingly and only in situations of acute anxiety and panic). Finally, I will order hydroxyzine for prn use, as well. He will try these medications and follow up with me in clinic in 6 weeks. He will continue EAP therapy in the meantime, as well. Discussed reasons to call or return to clinic. Shashi acknowledges and demonstrates understanding of circumstances under which care should be sought urgently or emergently. Follow up as discussed. Discussed risks, benefits, alternatives, potential side effects, and proper administration of new medication / treatment. Agrees with plan of care. All questions answered.       Diagnoses and all orders for this visit:    Generalized anxiety disorder  -     escitalopram (LEXAPRO) 10 MG tablet; Take 1 tablet (10 mg) by mouth daily  -     hydrOXYzine (VISTARIL) 25 MG capsule; Take 1-4 capsules ( mg) by mouth 3 times daily as needed for anxiety    Panic attack  -     ALPRAZolam (XANAX) 0.5 MG tablet; Take 1-2 tablets (0.5-1 mg) by mouth 2 times daily as needed for anxiety  -     hydrOXYzine (VISTARIL) 25 MG capsule; Take 1-4 capsules ( mg) by mouth 3 times daily as needed for anxiety        See Patient Instructions    Return in about 6 weeks (around 4/17/2020) for Routine Visit.    Cesar Gonzalez NP  [facility] [place]

## 2020-03-07 ASSESSMENT — ANXIETY QUESTIONNAIRES: GAD7 TOTAL SCORE: 13

## 2020-03-20 ENCOUNTER — E-VISIT (OUTPATIENT)
Dept: FAMILY MEDICINE | Facility: CLINIC | Age: 35
End: 2020-03-20
Payer: COMMERCIAL

## 2020-03-20 DIAGNOSIS — Z20.2 CHLAMYDIA CONTACT: Primary | ICD-10-CM

## 2020-03-20 PROCEDURE — 99421 OL DIG E/M SVC 5-10 MIN: CPT | Performed by: FAMILY MEDICINE

## 2020-03-22 RX ORDER — AZITHROMYCIN 500 MG/1
1000 TABLET, FILM COATED ORAL DAILY
Qty: 2 TABLET | Refills: 0 | Status: SHIPPED | OUTPATIENT
Start: 2020-03-22 | End: 2020-03-23

## 2020-03-22 NOTE — PATIENT INSTRUCTIONS
Thank you for choosing us for your care. I have placed an order for a prescription so that you can start treatment. View your full visit summary for details by clicking on the link below. Your pharmacist will able to address any questions you may have about the medication.     If you re not feeling better within 2-3 days, please schedule an appointment.  You can schedule an appointment right here in [place], or call [phone]  If the visit is for the same symptoms as your e-visit, we ll refund the cost of your e-visit if seen within seven days.    Follow up for visit if having not resolved symptoms. Take antibiotics for chlamydia as you have had contact  May need labs and visit if not resolving or new symptoms arise  Sonu Quiroz D.O.

## 2020-04-30 ENCOUNTER — VIRTUAL VISIT (OUTPATIENT)
Dept: FAMILY MEDICINE | Facility: CLINIC | Age: 35
End: 2020-04-30
Payer: COMMERCIAL

## 2020-04-30 DIAGNOSIS — F41.1 GENERALIZED ANXIETY DISORDER: ICD-10-CM

## 2020-04-30 DIAGNOSIS — J45.30 MILD PERSISTENT ALLERGIC ASTHMA: ICD-10-CM

## 2020-04-30 DIAGNOSIS — G47.00 INSOMNIA, UNSPECIFIED TYPE: ICD-10-CM

## 2020-04-30 DIAGNOSIS — F41.0 PANIC ATTACK: ICD-10-CM

## 2020-04-30 PROCEDURE — 99214 OFFICE O/P EST MOD 30 MIN: CPT | Mod: TEL | Performed by: NURSE PRACTITIONER

## 2020-04-30 RX ORDER — ALPRAZOLAM 0.5 MG
.5-1 TABLET ORAL 2 TIMES DAILY PRN
Qty: 20 TABLET | Refills: 0 | Status: SHIPPED | OUTPATIENT
Start: 2020-04-30 | End: 2020-07-15

## 2020-04-30 RX ORDER — MOMETASONE FUROATE AND FORMOTEROL FUMARATE DIHYDRATE 200; 5 UG/1; UG/1
2 AEROSOL RESPIRATORY (INHALATION) 2 TIMES DAILY
Qty: 1 INHALER | Refills: 11 | Status: CANCELLED | OUTPATIENT
Start: 2020-04-30

## 2020-04-30 RX ORDER — ESCITALOPRAM OXALATE 10 MG/1
10 TABLET ORAL DAILY
Qty: 90 TABLET | Refills: 1 | Status: SHIPPED | OUTPATIENT
Start: 2020-04-30 | End: 2020-07-15

## 2020-04-30 RX ORDER — TRAZODONE HYDROCHLORIDE 100 MG/1
100 TABLET ORAL PRN
Qty: 90 TABLET | Refills: 1 | Status: SHIPPED | OUTPATIENT
Start: 2020-04-30 | End: 2020-12-31

## 2020-04-30 ASSESSMENT — ANXIETY QUESTIONNAIRES
2. NOT BEING ABLE TO STOP OR CONTROL WORRYING: NOT AT ALL
1. FEELING NERVOUS, ANXIOUS, OR ON EDGE: SEVERAL DAYS
6. BECOMING EASILY ANNOYED OR IRRITABLE: SEVERAL DAYS
GAD7 TOTAL SCORE: 4
IF YOU CHECKED OFF ANY PROBLEMS ON THIS QUESTIONNAIRE, HOW DIFFICULT HAVE THESE PROBLEMS MADE IT FOR YOU TO DO YOUR WORK, TAKE CARE OF THINGS AT HOME, OR GET ALONG WITH OTHER PEOPLE: SOMEWHAT DIFFICULT
3. WORRYING TOO MUCH ABOUT DIFFERENT THINGS: SEVERAL DAYS
7. FEELING AFRAID AS IF SOMETHING AWFUL MIGHT HAPPEN: NOT AT ALL
5. BEING SO RESTLESS THAT IT IS HARD TO SIT STILL: NOT AT ALL

## 2020-04-30 ASSESSMENT — PATIENT HEALTH QUESTIONNAIRE - PHQ9
5. POOR APPETITE OR OVEREATING: SEVERAL DAYS
SUM OF ALL RESPONSES TO PHQ QUESTIONS 1-9: 3

## 2020-04-30 NOTE — PROGRESS NOTES
"Shashi Yates is a 34 year old male who is being evaluated via a billable telephone visit.      The patient has been notified of following:     \"This telephone visit will be conducted via a call between you and your physician/provider. We have found that certain health care needs can be provided without the need for a physical exam.  This service lets us provide the care you need with a short phone conversation.  If a prescription is necessary we can send it directly to your pharmacy.  If lab work is needed we can place an order for that and you can then stop by our lab to have the test done at a later time.    Telephone visits are billed at different rates depending on your insurance coverage. During this emergency period, for some insurers they may be billed the same as an in-person visit.  Please reach out to your insurance provider with any questions.    If during the course of the call the physician/provider feels a telephone visit is not appropriate, you will not be charged for this service.\"    Patient has given verbal consent for Telephone visit?  Yes    How would you like to obtain your AVS? Ned Maddox     Shashi Yates is a 34 year old male who presents to clinic today for the following health issues:    Asthma Follow-Up - instructed pt to go to [facility] and fill out ACT    Was ACT completed today?    Yes    ACT Total Scores 2/4/2020   ACT TOTAL SCORE (Goal Greater than or Equal to 20) 19   In the past 12 months, how many times did you visit the emergency room for your asthma without being admitted to the hospital? 0   In the past 12 months, how many times were you hospitalized overnight because of your asthma? 0       How many days per week do you miss taking your asthma controller medication?  0    Please describe any recent triggers for your asthma: weather change     Have you had any Emergency Room Visits, Urgent Care Visits, or Hospital Admissions since your last office visit?  " No      How many servings of fruits and vegetables do you eat daily?  0-1    On average, how many sweetened beverages do you drink each day (Examples: soda, juice, sweet tea, etc.  Do NOT count diet or artificially sweetened beverages)?   0    How many days per week do you exercise enough to make your heart beat faster? 3 or less    How many minutes a day do you exercise enough to make your heart beat faster? 30 - 60    How many days per week do you miss taking your medication? 0      Anxiety Follow-Up    How are you doing with your anxiety since your last visit? Improved     Are you having other symptoms that might be associated with anxiety? Yes:  works in [employer], stressful with Covid     Have you had a significant life event? OTHER: death of mother      Are you feeling depressed? No    Do you have any concerns with your use of alcohol or other drugs? No    Social History     Tobacco Use     Smoking status: Never Smoker     Smokeless tobacco: Never Used   Substance Use Topics     Alcohol use: No     Drug use: No     NIKO-7 SCORE 1/13/2020 3/6/2020 4/30/2020   Total Score 9 13 4     PHQ 1/13/2020 3/6/2020 4/30/2020   PHQ-9 Total Score 3 11 3   Q9: Thoughts of better off dead/self-harm past 2 weeks Not at all Not at all Not at all     Last PHQ-9 4/30/2020   1.  Little interest or pleasure in doing things 1   2.  Feeling down, depressed, or hopeless 0   3.  Trouble falling or staying asleep, or sleeping too much 1   4.  Feeling tired or having little energy 1   5.  Poor appetite or overeating 0   6.  Feeling bad about yourself 0   7.  Trouble concentrating 0   8.  Moving slowly or restless 0   Q9: Thoughts of better off dead/self-harm past 2 weeks 0   PHQ-9 Total Score 3   Difficulty at work, home, or with people Somewhat difficult     NIKO-7  4/30/2020   1. Feeling nervous, anxious, or on edge 1   2. Not being able to stop or control worrying 0   3. Worrying too much about different things 1   4. Trouble relaxing  1   5. Being so restless that it is hard to sit still 0   6. Becoming easily annoyed or irritable 1   7. Feeling afraid, as if something awful might happen 0   NIKO-7 Total Score 4   If you checked any problems, how difficult have they made it for you to do your work, take care of things at home, or get along with other people? Somewhat difficult     HPI:     Shashi calls today for medication check (asthma, anxiety, and insomnia).    He reports that his asthma has been relatively well-controlled using Dulera and prn albuterol. He will be sending his ACT back in the mail soon and anticipates that his score will be over 20. No ED visits or hospital admissions related to asthma recently.     He recently started using Lexapro daily for anxiety with prn alprazolam for acute anxiety / panic. He relates that he is doing much better since having started these medications. He declines changing this regimen today. No suicidal ideation, plan, or intent.     Patient Active Problem List   Diagnosis     Primary localized osteoarthrosis, lower leg     Iliotibial band syndrome     Trego's disease     Knee pain     Left knee pain     Aftercare following surgery of the musculoskeletal system     Past Surgical History:   Procedure Laterality Date     ARTHROSCOPY KNEE  6/14/2013    Procedure: ARTHROSCOPY KNEE;  Left Knee arthroscopy, partial lateral menisectomy, Open Hardware Removal  ;  Surgeon: Tiburcio Winkler MD;  [facility]     ARTHROSCOPY KNEE Left 10/28/2015    Procedure: ARTHROSCOPY KNEE;  Surgeon: Tiburcio Winkler MD;  [facility]     KNEE SURGERY       KNEE SURGERY  2003    surgery to correct bow leg     ORTHOPEDIC SURGERY  L knee     OSTEOTOMY TIBIA Left 10/28/2015    Procedure: OSTEOTOMY TIBIA;  Surgeon: Tiburcio Winkler MD;  [facility]     REMOVE HARDWARE KNEE  6/14/2013    Procedure: REMOVE HARDWARE KNEE;;  Surgeon: Tiburcio Winkler MD;  Location:  OR       Social History      Tobacco Use     Smoking status: Never Smoker     Smokeless tobacco: Never Used   Substance Use Topics     Alcohol use: No     Family History   Problem Relation Age of Onset     Diabetes Father      Asthma Mother            Reviewed and updated as needed this visit by Provider  Tobacco  Allergies  Meds  Problems  Med Hx  Surg Hx  Fam Hx         Review of Systems   ROS COMP: Constitutional, HEENT, pulmonary, neuro and psych systems are negative, except as otherwise noted.       Objective   Reported vitals:  There were no vitals taken for this visit.   alert, no distress and cooperative  PSYCH: Alert and oriented times 3; coherent speech, normal   rate and volume, able to articulate logical thoughts, able   to abstract reason, no tangential thoughts, no hallucinations   or delusions  His affect is normal and pleasant  RESP: No cough, no audible wheezing, able to talk in full sentences  Remainder of exam unable to be completed due to telephone visits    Diagnostic Test Results:  Labs reviewed in Epic        Assessment/Plan:  1. Mild persistent allergic asthma  Comment: Symptoms well-controlled on current regimen. Refilled for 12 months. Will log his ACT score when we receive it by mail.     2. Panic attack  Comment: Sparingly uses alprazolam for breakthrough anxiety / panic. Will refill 20 tab supply.   - ALPRAZolam (XANAX) 0.5 MG tablet; Take 1-2 tablets (0.5-1 mg) by mouth 2 times daily as needed for anxiety  Dispense: 20 tablet; Refill: 0    3. Generalized anxiety disorder  Comment: Lexapro course well-tolerated and effective. Declines escalation of dose today but he will reach out if he decides that his symptoms are not well-controlled on 10 mg daily.   - escitalopram (LEXAPRO) 10 MG tablet; Take 1 tablet (10 mg) by mouth daily  Dispense: 90 tablet; Refill: 1    4. Insomnia, unspecified type  Comment: Trazodone nightly has been successful, but he feels like he may need a higher dose. Increased to 100 mg  nightly. Will follow up as needed.   - traZODone (DESYREL) 100 MG tablet; Take 1 tablet (100 mg) by mouth as needed for sleep  Dispense: 90 tablet; Refill: 1    Return in about 6 months (around 10/30/2020) for Routine Visit.      Phone call duration:  9 minutes    Cesar Gonzalez NP

## 2020-05-01 ASSESSMENT — ANXIETY QUESTIONNAIRES: GAD7 TOTAL SCORE: 4

## 2020-07-14 NOTE — PROGRESS NOTES
"Shashi Yates is a 35 year old male who is being evaluated via a billable telephone visit.      The patient has been notified of following:     \"This telephone visit will be conducted via a call between you and your physician/provider. We have found that certain health care needs can be provided without the need for a physical exam.  This service lets us provide the care you need with a short phone conversation.  If a prescription is necessary we can send it directly to your pharmacy.  If lab work is needed we can place an order for that and you can then stop by our lab to have the test done at a later time.    Telephone visits are billed at different rates depending on your insurance coverage. During this emergency period, for some insurers they may be billed the same as an in-person visit.  Please reach out to your insurance provider with any questions.    If during the course of the call the physician/provider feels a telephone visit is not appropriate, you will not be charged for this service.\"    Patient has given verbal consent for Telephone visit?  Yes    What phone number would you like to be contacted at? [phone]    How would you like to obtain your AVS? Ned Maddox     Shashi Yates is a 35 year old male who presents via phone visit today for the following health issues:    Anxiety Follow-Up    How are you doing with your anxiety since your last visit? Things had improved but finding self more irritable    Are you having other symptoms that might be associated with anxiety? No    Have you had a significant life event? Relationship Concerns And recent death in family    Are you feeling depressed? No    Do you have any concerns with your use of alcohol or other drugs? No    Social History     Tobacco Use     Smoking status: Never Smoker     Smokeless tobacco: Never Used   Substance Use Topics     Alcohol use: No     Drug use: No     NIKO-7 SCORE 3/6/2020 4/30/2020 7/15/2020   Total Score " 13 4 9     PHQ 3/6/2020 4/30/2020 7/15/2020   PHQ-9 Total Score 11 3 6   Q9: Thoughts of better off dead/self-harm past 2 weeks Not at all Not at all Not at all     Last PHQ-9 7/15/2020   1.  Little interest or pleasure in doing things 0   2.  Feeling down, depressed, or hopeless 0   3.  Trouble falling or staying asleep, or sleeping too much 2   4.  Feeling tired or having little energy 2   5.  Poor appetite or overeating 0   6.  Feeling bad about yourself 0   7.  Trouble concentrating 2   8.  Moving slowly or restless 0   Q9: Thoughts of better off dead/self-harm past 2 weeks 0   PHQ-9 Total Score 6   Difficulty at work, home, or with people -     NIKO-7  7/15/2020   1. Feeling nervous, anxious, or on edge 3   2. Not being able to stop or control worrying 0   3. Worrying too much about different things 2   4. Trouble relaxing 1   5. Being so restless that it is hard to sit still 0   6. Becoming easily annoyed or irritable 3   7. Feeling afraid, as if something awful might happen 0   NIKO-7 Total Score 9   If you checked any problems, how difficult have they made it for you to do your work, take care of things at home, or get along with other people? -         How many servings of fruits and vegetables do you eat daily?  2-3    On average, how many sweetened beverages do you drink each day (Examples: soda, juice, sweet tea, etc.  Do NOT count diet or artificially sweetened beverages)?   0    How many days per week do you exercise enough to make your heart beat faster? 5    How many minutes a day do you exercise enough to make your heart beat faster? 60 or more    How many days per week do you miss taking your medication? 0    HPI: Shashi calls today for medication check (anxiety and panic). Relationship issues and recent death in the family have contributed to decrement in anxiety symptom control. He does note improvement since the addition of escitalopram, but he feels like there is room for improvement (especially  in light of current life circumstances). No suicidal ideation, plan, or intent.     Patient Active Problem List   Diagnosis     Primary localized osteoarthrosis, lower leg     Iliotibial band syndrome     Meenakshi's disease     Knee pain     Left knee pain     Aftercare following surgery of the musculoskeletal system     Past Surgical History:   Procedure Laterality Date     ARTHROSCOPY KNEE  6/14/2013    Procedure: ARTHROSCOPY KNEE;  Left Knee arthroscopy, partial lateral menisectomy, Open Hardware Removal  ;  Surgeon: Tiburcio Winkler MD;  [facility]     ARTHROSCOPY KNEE Left 10/28/2015    Procedure: ARTHROSCOPY KNEE;  Surgeon: Tiburcio Winkler MD;  [facility]     KNEE SURGERY       KNEE SURGERY  2003    surgery to correct bow leg     ORTHOPEDIC SURGERY  L knee     OSTEOTOMY TIBIA Left 10/28/2015    Procedure: OSTEOTOMY TIBIA;  Surgeon: Tiburcio Winkler MD;  [facility]     REMOVE HARDWARE KNEE  6/14/2013    Procedure: REMOVE HARDWARE KNEE;;  Surgeon: Tiburcio Winkler MD;  Location:  OR       Social History     Tobacco Use     Smoking status: Never Smoker     Smokeless tobacco: Never Used   Substance Use Topics     Alcohol use: No     Family History   Problem Relation Age of Onset     Diabetes Father      Asthma Mother            Reviewed and updated as needed this visit by Provider  Tobacco  Allergies  Meds  Problems  Med Hx  Surg Hx  Fam Hx         Review of Systems   Constitutional, neuro and psych systems are negative, except as otherwise noted.       Objective   Reported vitals:  There were no vitals taken for this visit.   healthy, alert and no distress  PSYCH: Alert and oriented times 3; coherent speech, normal   rate and volume, able to articulate logical thoughts, able   to abstract reason, no tangential thoughts, no hallucinations   or delusions  His affect is normal  RESP: No cough, no audible wheezing, able to talk in full sentences  Remainder of exam unable  to be completed due to telephone visits    Diagnostic Test Results:  Labs reviewed in Epic        Assessment/Plan:  1. Generalized anxiety disorder  Comment: We discussed options for dealing with current symptoms. He would like to trial an increase in his escitalopram dose. Will double this to 20 mg and follow up in a month or so, at which time he will update me regarding his response to this change. Still uses Xanax periodically for panic attacks. Will refill that, as well, today. No other concerns or issues.   - escitalopram (LEXAPRO) 20 MG tablet; Take 1 tablet (20 mg) by mouth daily  Dispense: 90 tablet; Refill: 1    2. Panic attack  - ALPRAZolam (XANAX) 0.5 MG tablet; Take 1-2 tablets (0.5-1 mg) by mouth 2 times daily as needed for anxiety  Dispense: 20 tablet; Refill: 0    Return in about 4 weeks (around 8/12/2020) for Routine Visit.      Phone call duration:  5 minutes    Cesar Gonzalez NP

## 2020-07-15 ENCOUNTER — VIRTUAL VISIT (OUTPATIENT)
Dept: FAMILY MEDICINE | Facility: CLINIC | Age: 35
End: 2020-07-15
Payer: COMMERCIAL

## 2020-07-15 DIAGNOSIS — F41.0 PANIC ATTACK: ICD-10-CM

## 2020-07-15 DIAGNOSIS — F41.1 GENERALIZED ANXIETY DISORDER: ICD-10-CM

## 2020-07-15 PROCEDURE — 99213 OFFICE O/P EST LOW 20 MIN: CPT | Mod: TEL | Performed by: NURSE PRACTITIONER

## 2020-07-15 RX ORDER — ESCITALOPRAM OXALATE 20 MG/1
20 TABLET ORAL DAILY
Qty: 90 TABLET | Refills: 1 | Status: SHIPPED | OUTPATIENT
Start: 2020-07-15 | End: 2020-08-18 | Stop reason: SINTOL

## 2020-07-15 RX ORDER — ALPRAZOLAM 0.5 MG
.5-1 TABLET ORAL 2 TIMES DAILY PRN
Qty: 20 TABLET | Refills: 0 | Status: SHIPPED | OUTPATIENT
Start: 2020-07-15 | End: 2021-04-08

## 2020-07-15 ASSESSMENT — ANXIETY QUESTIONNAIRES
3. WORRYING TOO MUCH ABOUT DIFFERENT THINGS: MORE THAN HALF THE DAYS
GAD7 TOTAL SCORE: 9
2. NOT BEING ABLE TO STOP OR CONTROL WORRYING: NOT AT ALL
1. FEELING NERVOUS, ANXIOUS, OR ON EDGE: NEARLY EVERY DAY
5. BEING SO RESTLESS THAT IT IS HARD TO SIT STILL: NOT AT ALL
6. BECOMING EASILY ANNOYED OR IRRITABLE: NEARLY EVERY DAY
7. FEELING AFRAID AS IF SOMETHING AWFUL MIGHT HAPPEN: NOT AT ALL

## 2020-07-15 ASSESSMENT — PATIENT HEALTH QUESTIONNAIRE - PHQ9
5. POOR APPETITE OR OVEREATING: SEVERAL DAYS
SUM OF ALL RESPONSES TO PHQ QUESTIONS 1-9: 6

## 2020-07-16 ASSESSMENT — ANXIETY QUESTIONNAIRES: GAD7 TOTAL SCORE: 9

## 2020-08-18 ENCOUNTER — VIRTUAL VISIT (OUTPATIENT)
Dept: FAMILY MEDICINE | Facility: CLINIC | Age: 35
End: 2020-08-18

## 2020-08-18 DIAGNOSIS — F43.23 ADJUSTMENT DISORDER WITH MIXED ANXIETY AND DEPRESSED MOOD: ICD-10-CM

## 2020-08-18 DIAGNOSIS — J30.2 SEASONAL ALLERGIC RHINITIS, UNSPECIFIED TRIGGER: ICD-10-CM

## 2020-08-18 DIAGNOSIS — F41.1 GENERALIZED ANXIETY DISORDER: Primary | ICD-10-CM

## 2020-08-18 DIAGNOSIS — J45.30 MILD PERSISTENT ALLERGIC ASTHMA: ICD-10-CM

## 2020-08-18 PROCEDURE — 99214 OFFICE O/P EST MOD 30 MIN: CPT | Mod: 95 | Performed by: NURSE PRACTITIONER

## 2020-08-18 RX ORDER — MONTELUKAST SODIUM 10 MG/1
10 TABLET ORAL AT BEDTIME
COMMUNITY
Start: 2019-01-08 | End: 2020-08-18

## 2020-08-18 RX ORDER — MONTELUKAST SODIUM 10 MG/1
10 TABLET ORAL AT BEDTIME
Qty: 90 TABLET | Refills: 3 | Status: SHIPPED | OUTPATIENT
Start: 2020-08-18 | End: 2021-12-07

## 2020-08-18 RX ORDER — BUPROPION HYDROCHLORIDE 150 MG/1
150 TABLET ORAL EVERY MORNING
Qty: 90 TABLET | Refills: 1 | Status: SHIPPED | OUTPATIENT
Start: 2020-08-18 | End: 2021-04-08 | Stop reason: SINTOL

## 2020-08-18 ASSESSMENT — ANXIETY QUESTIONNAIRES
GAD7 TOTAL SCORE: 0
6. BECOMING EASILY ANNOYED OR IRRITABLE: NOT AT ALL
2. NOT BEING ABLE TO STOP OR CONTROL WORRYING: NOT AT ALL
7. FEELING AFRAID AS IF SOMETHING AWFUL MIGHT HAPPEN: NOT AT ALL
5. BEING SO RESTLESS THAT IT IS HARD TO SIT STILL: NOT AT ALL
3. WORRYING TOO MUCH ABOUT DIFFERENT THINGS: NOT AT ALL
1. FEELING NERVOUS, ANXIOUS, OR ON EDGE: NOT AT ALL

## 2020-08-18 ASSESSMENT — PATIENT HEALTH QUESTIONNAIRE - PHQ9
SUM OF ALL RESPONSES TO PHQ QUESTIONS 1-9: 4
5. POOR APPETITE OR OVEREATING: NOT AT ALL

## 2020-08-18 NOTE — PATIENT INSTRUCTIONS
1. Begin taking 10 mg escitalopram daily for one week. You may start bupropion in the middle of this first week and continue it daily.   2. Take 10 mg escitalopram every other day for one week.  3. Take 10 mg escitalopram every third day for one week.  4. Every fourth day for one week.     Let me know how things are going with the Wellbutrin after a month or so.

## 2020-08-18 NOTE — PROGRESS NOTES
"Shashi Yates is a 35 year old male who is being evaluated via a billable telephone visit.      The patient has been notified of following:     \"This telephone visit will be conducted via a call between you and your physician/provider. We have found that certain health care needs can be provided without the need for a physical exam.  This service lets us provide the care you need with a short phone conversation.  If a prescription is necessary we can send it directly to your pharmacy.  If lab work is needed we can place an order for that and you can then stop by our lab to have the test done at a later time.    Telephone visits are billed at different rates depending on your insurance coverage. During this emergency period, for some insurers they may be billed the same as an in-person visit.  Please reach out to your insurance provider with any questions.    If during the course of the call the physician/provider feels a telephone visit is not appropriate, you will not be charged for this service.\"    Patient has given verbal consent for Telephone visit?  Yes    What phone number would you like to be contacted at? [phone]     How would you like to obtain your AVS? Ned Maddox     Shashi Yates is a 35 year old male who presents via phone visit today for the following health issues:    HPI    Anxiety Follow-Up    How are you doing with your anxiety since your last visit? Improved except for fatigue    Are you having other symptoms that might be associated with anxiety? Yes:  extreme fatigue     Have you had a significant life event? No Going through counseling with ex    Are you feeling depressed? No    Do you have any concerns with your use of alcohol or other drugs? No    Social History     Tobacco Use     Smoking status: Never Smoker     Smokeless tobacco: Never Used   Substance Use Topics     Alcohol use: No     Drug use: No     NIKO-7 SCORE 4/30/2020 7/15/2020 8/18/2020   Total Score 4 9 0 "     PHQ 4/30/2020 7/15/2020 8/18/2020   PHQ-9 Total Score 3 6 4   Q9: Thoughts of better off dead/self-harm past 2 weeks Not at all Not at all Not at all     Last PHQ-9 8/18/2020   1.  Little interest or pleasure in doing things 0   2.  Feeling down, depressed, or hopeless 0   3.  Trouble falling or staying asleep, or sleeping too much 1   4.  Feeling tired or having little energy 3   5.  Poor appetite or overeating 0   6.  Feeling bad about yourself 0   7.  Trouble concentrating 0   8.  Moving slowly or restless 0   Q9: Thoughts of better off dead/self-harm past 2 weeks 0   PHQ-9 Total Score 4   Difficulty at work, home, or with people -     NIKO-7  8/18/2020   1. Feeling nervous, anxious, or on edge 0   2. Not being able to stop or control worrying 0   3. Worrying too much about different things 0   4. Trouble relaxing 0   5. Being so restless that it is hard to sit still 0   6. Becoming easily annoyed or irritable 0   7. Feeling afraid, as if something awful might happen 0   NIKO-7 Total Score 0   If you checked any problems, how difficult have they made it for you to do your work, take care of things at home, or get along with other people? -       HPI: Shashi presents today to discuss his anti-anxiety regimen. He has been taking escitalopram with a good deal of success (mood-wise) for the past several months. However, he has encountered sexual side effects (low desire and erectile issues) that he attributes to this medication, as well as fatigue / sluggishness. He would like to try a different agent today. He states that he has used Wellbutrin successfully in the past for anxiety and depression. He notes that he did not experience the sexual and constitutional side effects with that agent.     He also needs a refill of his Singulair, as his seasonal allergies have gotten to be quite bad as of late. Doing Flonase, nasal / sinus rinse, and Claritin already.     Patient Active Problem List   Diagnosis     Primary  localized osteoarthrosis, lower leg     Iliotibial band syndrome     Desha's disease     Knee pain     Left knee pain     Aftercare following surgery of the musculoskeletal system     Adjustment disorder with mixed anxiety and depressed mood     Past Surgical History:   Procedure Laterality Date     ARTHROSCOPY KNEE  6/14/2013    Procedure: ARTHROSCOPY KNEE;  Left Knee arthroscopy, partial lateral menisectomy, Open Hardware Removal  ;  Surgeon: Tiburcio Winkler MD;  [facility]     ARTHROSCOPY KNEE Left 10/28/2015    Procedure: ARTHROSCOPY KNEE;  Surgeon: Tiburcio Winkler MD;  [facility]     KNEE SURGERY       KNEE SURGERY  2003    surgery to correct bow leg     ORTHOPEDIC SURGERY  L knee     OSTEOTOMY TIBIA Left 10/28/2015    Procedure: OSTEOTOMY TIBIA;  Surgeon: Tiburcio Winkler MD;  [facility]     REMOVE HARDWARE KNEE  6/14/2013    Procedure: REMOVE HARDWARE KNEE;;  Surgeon: Tiburcio Winkler MD;  [facility]       Social History     Tobacco Use     Smoking status: Never Smoker     Smokeless tobacco: Never Used   Substance Use Topics     Alcohol use: No     Family History   Problem Relation Age of Onset     Diabetes Father      Asthma Mother            Reviewed and updated as needed this visit by Provider  Tobacco  Allergies  Meds  Problems  Med Hx  Surg Hx  Fam Hx         Review of Systems   Constitutional, neuro and psych systems are negative, except as otherwise noted.       Objective          Vitals:  No vitals were obtained today due to virtual visit.    healthy, alert and no distress  PSYCH: Alert and oriented times 3; coherent speech, normal   rate and volume, able to articulate logical thoughts, able   to abstract reason, no tangential thoughts, no hallucinations   or delusions  His affect is normal  RESP: No cough, no audible wheezing, able to talk in full sentences  Remainder of exam unable to be completed due to telephone visits    Diagnostic Test  "Results:  Labs reviewed in Epic        Assessment/Plan:    Assessment & Plan     Shashi was seen today for anxiety. We discussed potential approaches to amending his therapy with the goal of minimizing the side effects that he has been experiencing. He states that he would really like to try Wellbutrin once again for this issue. I did caution him that this is not an agent labelled to treat anxiety (and in some cases, this makes anxiety worse), but he would like to trial it nonetheless. We discussed a taper schedule for Lexapro (see AVS) and how to start bupropion. He will keep me posted if he encounters problems with this wean and/or start. I will hear from him in a month or so to have him report on his response to this switch. No suicidal ideation, plan, or intent. Discussed risks, benefits, alternatives, potential side effects, and proper administration of new medication / treatment. Agrees with plan of care. All questions answered.       Diagnoses and all orders for this visit:    Generalized anxiety disorder  -     buPROPion (WELLBUTRIN XL) 150 MG 24 hr tablet; Take 1 tablet (150 mg) by mouth every morning    Adjustment disorder with mixed anxiety and depressed mood  -     buPROPion (WELLBUTRIN XL) 150 MG 24 hr tablet; Take 1 tablet (150 mg) by mouth every morning    Seasonal allergic rhinitis, unspecified trigger  -     montelukast (SINGULAIR) 10 MG tablet; Take 1 tablet (10 mg) by mouth At Bedtime    Mild persistent allergic asthma  -     montelukast (SINGULAIR) 10 MG tablet; Take 1 tablet (10 mg) by mouth At Bedtime         BMI:   Estimated body mass index is 34.87 kg/m  as calculated from the following:    Height as of 2/4/20: 1.803 m (5' 11\").    Weight as of 3/6/20: 113.4 kg (250 lb).   Weight management plan: Discussed healthy diet and exercise guidelines        See Patient Instructions    Return in about 4 weeks (around 9/15/2020) for Routine Visit.    Cesar Gonzalez NP  [facility]    Phone call duration:  12 minutes

## 2020-08-19 ASSESSMENT — ANXIETY QUESTIONNAIRES: GAD7 TOTAL SCORE: 0

## 2020-08-22 ENCOUNTER — TRANSFERRED RECORDS (OUTPATIENT)
Dept: HEALTH INFORMATION MANAGEMENT | Facility: CLINIC | Age: 35
End: 2020-08-22

## 2020-11-22 ENCOUNTER — HEALTH MAINTENANCE LETTER (OUTPATIENT)
Age: 35
End: 2020-11-22

## 2020-12-09 ENCOUNTER — TELEPHONE (OUTPATIENT)
Dept: FAMILY MEDICINE | Facility: CLINIC | Age: 35
End: 2020-12-09

## 2020-12-09 NOTE — TELEPHONE ENCOUNTER
Needs of attention regarding:  -Asthma    Health Maintenance Topics with due status: Overdue       Topic Date Due    ASTHMA ACTION PLAN 1987    Pneumococcal Vaccine: Pediatrics (0 to 5 Years) and At-Risk Patients (6 to 64 Years) 07/10/1993    DTAP/TDAP/TD IMMUNIZATION 06/01/2020    ASTHMA CONTROL TEST 08/04/2020    INFLUENZA VACCINE 09/01/2020     Health Maintenance Topics with due status: Due On       Topic Date Due    PREVENTIVE CARE VISIT 11/26/2020       Communication:  See MyChart message

## 2020-12-31 DIAGNOSIS — G47.00 INSOMNIA, UNSPECIFIED TYPE: ICD-10-CM

## 2020-12-31 RX ORDER — TRAZODONE HYDROCHLORIDE 100 MG/1
100 TABLET ORAL PRN
Qty: 90 TABLET | Refills: 1 | Status: SHIPPED | OUTPATIENT
Start: 2020-12-31 | End: 2022-01-13 | Stop reason: ALTCHOICE

## 2021-01-15 ENCOUNTER — HEALTH MAINTENANCE LETTER (OUTPATIENT)
Age: 36
End: 2021-01-15

## 2021-04-01 DIAGNOSIS — F43.23 ADJUSTMENT DISORDER WITH MIXED ANXIETY AND DEPRESSED MOOD: ICD-10-CM

## 2021-04-01 DIAGNOSIS — F41.1 GENERALIZED ANXIETY DISORDER: ICD-10-CM

## 2021-04-01 RX ORDER — BUPROPION HYDROCHLORIDE 150 MG/1
150 TABLET ORAL EVERY MORNING
Qty: 90 TABLET | Refills: 1 | Status: CANCELLED | OUTPATIENT
Start: 2021-04-01

## 2021-04-05 NOTE — TELEPHONE ENCOUNTER
2 nd attempt    Left message on answering machine for patient to call back.      Narcisa ALMONTERN Triage  [facility]  [phone]

## 2021-04-08 ENCOUNTER — VIRTUAL VISIT (OUTPATIENT)
Dept: FAMILY MEDICINE | Facility: CLINIC | Age: 36
End: 2021-04-08

## 2021-04-08 DIAGNOSIS — F41.0 PANIC ATTACK: ICD-10-CM

## 2021-04-08 DIAGNOSIS — F41.1 GENERALIZED ANXIETY DISORDER: Primary | ICD-10-CM

## 2021-04-08 PROCEDURE — 96127 BRIEF EMOTIONAL/BEHAV ASSMT: CPT | Performed by: NURSE PRACTITIONER

## 2021-04-08 PROCEDURE — 99214 OFFICE O/P EST MOD 30 MIN: CPT | Mod: 95 | Performed by: NURSE PRACTITIONER

## 2021-04-08 RX ORDER — DULOXETIN HYDROCHLORIDE 60 MG/1
60 CAPSULE, DELAYED RELEASE ORAL DAILY
Qty: 90 CAPSULE | Refills: 0 | Status: SHIPPED | OUTPATIENT
Start: 2021-04-08 | End: 2021-08-02

## 2021-04-08 RX ORDER — ALPRAZOLAM 0.5 MG
.5-1 TABLET ORAL 2 TIMES DAILY PRN
Qty: 20 TABLET | Refills: 0 | Status: SHIPPED | OUTPATIENT
Start: 2021-04-08 | End: 2021-08-02

## 2021-04-08 ASSESSMENT — PATIENT HEALTH QUESTIONNAIRE - PHQ9
5. POOR APPETITE OR OVEREATING: NEARLY EVERY DAY
SUM OF ALL RESPONSES TO PHQ QUESTIONS 1-9: 7

## 2021-04-08 ASSESSMENT — ANXIETY QUESTIONNAIRES
IF YOU CHECKED OFF ANY PROBLEMS ON THIS QUESTIONNAIRE, HOW DIFFICULT HAVE THESE PROBLEMS MADE IT FOR YOU TO DO YOUR WORK, TAKE CARE OF THINGS AT HOME, OR GET ALONG WITH OTHER PEOPLE: SOMEWHAT DIFFICULT
GAD7 TOTAL SCORE: 14
6. BECOMING EASILY ANNOYED OR IRRITABLE: NEARLY EVERY DAY
5. BEING SO RESTLESS THAT IT IS HARD TO SIT STILL: SEVERAL DAYS
2. NOT BEING ABLE TO STOP OR CONTROL WORRYING: NEARLY EVERY DAY
7. FEELING AFRAID AS IF SOMETHING AWFUL MIGHT HAPPEN: SEVERAL DAYS
1. FEELING NERVOUS, ANXIOUS, OR ON EDGE: SEVERAL DAYS
3. WORRYING TOO MUCH ABOUT DIFFERENT THINGS: MORE THAN HALF THE DAYS

## 2021-04-08 NOTE — TELEPHONE ENCOUNTER
Called patient- states that he feels the mediciation could be increased. Going through some stressors at home and with his S.O. Appointment scheduled for a telephone visit today with SONY Duong CNP    phq9 completed    PHQ 7/15/2020 8/18/2020 4/8/2021   PHQ-9 Total Score 6 4 7   Q9: Thoughts of better off dead/self-harm past 2 weeks Not at all Not at all Not at all

## 2021-04-08 NOTE — PROGRESS NOTES
Shashi is a 35 year old who is being evaluated via a billable telephone visit.      What phone number would you like to be contacted at? [phone]  How would you like to obtain your AVS? Ned    Assessment & Plan     Generalized anxiety disorder    - DULoxetine (CYMBALTA) 60 MG capsule  Dispense: 90 capsule; Refill: 0    Panic attack    - ALPRAZolam (XANAX) 0.5 MG tablet  Dispense: 20 tablet; Refill: 0      Comment: Discussed options today and decided to trial duloxetine. Besides relief of anxiety, his biggest priority is avoidance of weight gain and sexual dysfunction. Cymbalta is less notorious for causing both of those, we elected to give this a try. He will schedule a follow up visit with me in 6 weeks or so to evaluate his progress. Discussed risks, benefits, alternatives, potential side effects, and proper administration of new medication / treatment. Agrees with plan of care. All questions answered.        See Patient Instructions    Return in about 6 weeks (around 5/20/2021) for Routine Visit.    Cesar Gonzalez NP  [facility]    Subjective   Shashi is a 35 year old who presents for the following health issues     HPI     Depression and Anxiety Follow-Up    How are you doing with your depression since your last visit? Worsened     How are you doing with your anxiety since your last visit?  Worsened     Pt feels that bupropion is not helping, feels like he doesn't want to do anything and eats more    Are you having other symptoms that might be associated with depression or anxiety? No    Have you had a significant life event? OTHER: life in general with covid and finding a job     Do you have any concerns with your use of alcohol or other drugs? No    Social History     Tobacco Use     Smoking status: Never Smoker     Smokeless tobacco: Never Used   Substance Use Topics     Alcohol use: No     Drug use: No     PHQ 7/15/2020 8/18/2020 4/8/2021   PHQ-9 Total Score 6 4 7   Q9:  Thoughts of better off dead/self-harm past 2 weeks Not at all Not at all Not at all     NIKO-7 SCORE 7/15/2020 8/18/2020 4/8/2021   Total Score 9 0 14     Last PHQ-9 4/8/2021   1.  Little interest or pleasure in doing things 1   2.  Feeling down, depressed, or hopeless 1   3.  Trouble falling or staying asleep, or sleeping too much 1   4.  Feeling tired or having little energy 1   5.  Poor appetite or overeating 0   6.  Feeling bad about yourself 1   7.  Trouble concentrating 1   8.  Moving slowly or restless 1   Q9: Thoughts of better off dead/self-harm past 2 weeks 0   PHQ-9 Total Score 7   Difficulty at work, home, or with people Not difficult at all     NIKO-7  4/8/2021   1. Feeling nervous, anxious, or on edge 1   2. Not being able to stop or control worrying 3   3. Worrying too much about different things 2   4. Trouble relaxing 3   5. Being so restless that it is hard to sit still 1   6. Becoming easily annoyed or irritable 3   7. Feeling afraid, as if something awful might happen 1   NIKO-7 Total Score 14   If you checked any problems, how difficult have they made it for you to do your work, take care of things at home, or get along with other people? Somewhat difficult       Suicide Assessment Five-step Evaluation and Treatment (SAFE-T)      How many servings of fruits and vegetables do you eat daily?  2-3    On average, how many sweetened beverages do you drink each day (Examples: soda, juice, sweet tea, etc.  Do NOT count diet or artificially sweetened beverages)?   0    How many days per week do you exercise enough to make your heart beat faster? 7    How many minutes a day do you exercise enough to make your heart beat faster? 30 - 60    How many days per week do you miss taking your medication? 0    HPI: Shashi presents today with the complaint of worsening anxiety.      Was laid off from job (due to COVID).    He was started on escitalopram a year or so ago, but this was switched to bupropion due to  sexual side effects and weight gain noted while using escitalopram.     He reports today that things had been going pretty well for the first couple months after this change, but he has once again begun to struggle significantly due to life circumstances. He is wondering about other options. He is also having panic attacks more frequently and is out of Xanax.     Review of Systems   Constitutional, HEENT, cardiovascular, pulmonary, GI, , musculoskeletal, neuro, skin, endocrine and psych systems are negative, except as otherwise noted.      Objective           Vitals:  No vitals were obtained today due to virtual visit.    Physical Exam   healthy, alert and no distress  PSYCH: Alert and oriented times 3; coherent speech, normal   rate and volume, able to articulate logical thoughts, able   to abstract reason, no tangential thoughts, no hallucinations   or delusions  His affect is normal  RESP: No cough, no audible wheezing, able to talk in full sentences  Remainder of exam unable to be completed due to telephone visits            Phone call duration: 9 minutes

## 2021-04-09 ASSESSMENT — ANXIETY QUESTIONNAIRES: GAD7 TOTAL SCORE: 14

## 2021-05-04 ENCOUNTER — TELEPHONE (OUTPATIENT)
Dept: FAMILY MEDICINE | Facility: CLINIC | Age: 36
End: 2021-05-04

## 2021-05-04 NOTE — TELEPHONE ENCOUNTER
Patient Quality Outreach      Summary:    Patient has the following on his problem list/HM:   Asthma review       ACT Total Scores 2/4/2020   ACT TOTAL SCORE (Goal Greater than or Equal to 20) 19   In the past 12 months, how many times did you visit the emergency room for your asthma without being admitted to the hospital? 0   In the past 12 months, how many times were you hospitalized overnight because of your asthma? 0          Patient is due/failing the following:   ACT needed    Type of outreach:    Sent [employer] message.    Questions for provider review:    None                                                                                                                                     Minh RENO CMA

## 2021-05-19 NOTE — TELEPHONE ENCOUNTER
Patient Quality Outreach 2nd Attempt      Summary:    Type of outreach:    Phone, left message for patient/parent to call back.    Next Steps:  Reach out within 90 days via phone call .    Max number of attempts reached: Yes. Will try again in 90 days if patient still on fail list.    Questions for provider review:    None                                                                                                                    Minh RENO CMA       Chart routed to NONE.      Left message on voicemail for patient to call back. Minh RENO CMA  Pt is due to update ACT and also schedule Routine Visit per last office visit.

## 2021-06-19 ENCOUNTER — MYC MEDICAL ADVICE (OUTPATIENT)
Dept: FAMILY MEDICINE | Facility: CLINIC | Age: 36
End: 2021-06-19

## 2021-06-19 DIAGNOSIS — F41.1 GENERALIZED ANXIETY DISORDER: Primary | ICD-10-CM

## 2021-06-19 DIAGNOSIS — F43.23 ADJUSTMENT DISORDER WITH MIXED ANXIETY AND DEPRESSED MOOD: ICD-10-CM

## 2021-06-21 RX ORDER — ESCITALOPRAM OXALATE 20 MG/1
20 TABLET ORAL DAILY
Qty: 90 TABLET | Refills: 1 | Status: SHIPPED | OUTPATIENT
Start: 2021-06-21 | End: 2021-12-07

## 2021-08-02 ENCOUNTER — VIRTUAL VISIT (OUTPATIENT)
Dept: FAMILY MEDICINE | Facility: CLINIC | Age: 36
End: 2021-08-02
Payer: COMMERCIAL

## 2021-08-02 DIAGNOSIS — F41.0 PANIC ATTACK: ICD-10-CM

## 2021-08-02 DIAGNOSIS — N52.9 ERECTILE DYSFUNCTION, UNSPECIFIED ERECTILE DYSFUNCTION TYPE: ICD-10-CM

## 2021-08-02 DIAGNOSIS — J30.2 SEASONAL ALLERGIC RHINITIS, UNSPECIFIED TRIGGER: ICD-10-CM

## 2021-08-02 DIAGNOSIS — F43.23 ADJUSTMENT DISORDER WITH MIXED ANXIETY AND DEPRESSED MOOD: Primary | ICD-10-CM

## 2021-08-02 DIAGNOSIS — J45.30 MILD PERSISTENT ALLERGIC ASTHMA: ICD-10-CM

## 2021-08-02 PROCEDURE — 96127 BRIEF EMOTIONAL/BEHAV ASSMT: CPT | Performed by: NURSE PRACTITIONER

## 2021-08-02 PROCEDURE — 99214 OFFICE O/P EST MOD 30 MIN: CPT | Mod: 95 | Performed by: NURSE PRACTITIONER

## 2021-08-02 RX ORDER — SILDENAFIL 100 MG/1
100 TABLET, FILM COATED ORAL DAILY PRN
Qty: 30 TABLET | Refills: 4 | Status: SHIPPED | OUTPATIENT
Start: 2021-08-02 | End: 2022-01-13

## 2021-08-02 RX ORDER — FLUTICASONE PROPIONATE 50 MCG
2 SPRAY, SUSPENSION (ML) NASAL DAILY
Qty: 15.8 ML | Refills: 4 | Status: SHIPPED | OUTPATIENT
Start: 2021-08-02 | End: 2022-01-13

## 2021-08-02 RX ORDER — ALBUTEROL SULFATE 90 UG/1
AEROSOL, METERED RESPIRATORY (INHALATION)
Qty: 18 G | Refills: 3 | Status: SHIPPED | OUTPATIENT
Start: 2021-08-02 | End: 2022-07-25

## 2021-08-02 RX ORDER — ALPRAZOLAM 0.5 MG
.5-1 TABLET ORAL 2 TIMES DAILY PRN
Qty: 20 TABLET | Refills: 0 | Status: SHIPPED | OUTPATIENT
Start: 2021-08-02 | End: 2023-12-11

## 2021-08-02 ASSESSMENT — ANXIETY QUESTIONNAIRES
GAD7 TOTAL SCORE: 4
5. BEING SO RESTLESS THAT IT IS HARD TO SIT STILL: NOT AT ALL
6. BECOMING EASILY ANNOYED OR IRRITABLE: NOT AT ALL
IF YOU CHECKED OFF ANY PROBLEMS ON THIS QUESTIONNAIRE, HOW DIFFICULT HAVE THESE PROBLEMS MADE IT FOR YOU TO DO YOUR WORK, TAKE CARE OF THINGS AT HOME, OR GET ALONG WITH OTHER PEOPLE: SOMEWHAT DIFFICULT
1. FEELING NERVOUS, ANXIOUS, OR ON EDGE: MORE THAN HALF THE DAYS
7. FEELING AFRAID AS IF SOMETHING AWFUL MIGHT HAPPEN: NOT AT ALL
3. WORRYING TOO MUCH ABOUT DIFFERENT THINGS: SEVERAL DAYS
2. NOT BEING ABLE TO STOP OR CONTROL WORRYING: NOT AT ALL

## 2021-08-02 ASSESSMENT — PATIENT HEALTH QUESTIONNAIRE - PHQ9: 5. POOR APPETITE OR OVEREATING: SEVERAL DAYS

## 2021-08-02 ASSESSMENT — PAIN SCALES - GENERAL: PAINLEVEL: NO PAIN (0)

## 2021-08-02 NOTE — PROGRESS NOTES
Shashi is a 36 year old who is being evaluated via a billable telephone visit.      What phone number would you like to be contacted at? [phone]  [facility] you [facility]    Assessment & Plan       1. Adjustment disorder with mixed anxiety and depressed mood  Comment: Things are much-improved after switching back to Lexapro. However, he is experiencing the same sexual side effects that he was dealing with before. He did not tolerate SNRI or Wellbutrin, however. Will address ED as below.     2. Panic attack  - ALPRAZolam (XANAX) 0.5 MG tablet; Take 1-2 tablets (0.5-1 mg) by mouth 2 times daily as needed for anxiety  Dispense: 20 tablet; Refill: 0    3. Mild persistent allergic asthma  - albuterol (PROAIR HFA) 108 (90 Base) MCG/ACT inhaler; INHALE 1 TO 2 PUFFS EVERY 4 TO 6 HOURS AS NEEDED  Dispense: 18 g; Refill: 3    4. Seasonal allergic rhinitis, unspecified trigger  - fluticasone (FLONASE) 50 MCG/ACT nasal spray; Spray 2 sprays into both nostrils daily  Dispense: 15.8 mL; Refill: 4    5. Erectile dysfunction, unspecified erectile dysfunction type  Comment: Will re-initiate sildenafil regimen. He will follow up if there are issues.   - sildenafil (VIAGRA) 100 MG tablet; Take 1 tablet (100 mg) by mouth daily as needed (erective dysfunction)  Dispense: 30 tablet; Refill: 4      See Patient Instructions    Return in about 6 months (around 2/2/2022) for Routine Visit.    Cesar Gonzalez NP  [facility]   Shashi is a 36 year old who presents for the following health issues     HPI     Anxiety Follow-Up    How are you doing with your anxiety since your last visit? Improved some anxiety    Are you having other symptoms that might be associated with anxiety? Yes:  started a new job    Have you had a significant life event? OTHER: new job     Are you feeling depressed? No    Do you have any concerns with your use of alcohol or other drugs? No    Social  History     Tobacco Use     Smoking status: Never Smoker     Smokeless tobacco: Never Used   Substance Use Topics     Alcohol use: No     Drug use: No     NIKO-7 SCORE 8/18/2020 4/8/2021 8/2/2021   Total Score 0 14 4     PHQ 7/15/2020 8/18/2020 4/8/2021   PHQ-9 Total Score 6 4 7   Q9: Thoughts of better off dead/self-harm past 2 weeks Not at all Not at all Not at all     NIKO-7  8/2/2021   1. Feeling nervous, anxious, or on edge 2   2. Not being able to stop or control worrying 0   3. Worrying too much about different things 1   4. Trouble relaxing 1   5. Being so restless that it is hard to sit still 0   6. Becoming easily annoyed or irritable 0   7. Feeling afraid, as if something awful might happen 0   NIKO-7 Total Score 4   If you checked any problems, how difficult have they made it for you to do your work, take care of things at home, or get along with other people? Somewhat difficult       Asthma Follow-Up    Was ACT completed today?    Yes    ACT Total Scores 8/2/2021   ACT TOTAL SCORE (Goal Greater than or Equal to 20) 24   In the past 12 months, how many times did you visit the emergency room for your asthma without being admitted to the hospital? 0   In the past 12 months, how many times were you hospitalized overnight because of your asthma? 0          How many days per week do you miss taking your asthma controller medication?  0    Please describe any recent triggers for your asthma: air quality outside, has been staying in    Have you had any Emergency Room Visits, Urgent Care Visits, or Hospital Admissions since your last office visit?  No      How many servings of fruits and vegetables do you eat daily?  0-1    On average, how many sweetened beverages do you drink each day (Examples: soda, juice, sweet tea, etc.  Do NOT count diet or artificially sweetened beverages)?   1    How many days per week do you exercise enough to make your heart beat faster? 3 or less    How many minutes a day do you exercise  enough to make your heart beat faster? 60 or more    How many days per week do you miss taking your medication? 0    Medication Followup of lexapro    Taking Medication as prescribed: yes    Side Effects:  Sexual side affect    Medication Helping Symptoms:  yes     HPI: Shashi presents today for medication check.    1. Anxiety. He has been doing well since starting escitalopram 10 mg. He plans to escalate his dose to 20 mg. He does note, however, that he is now having erectile issues. In the past, he has successfully used sildenafil for erectile dysfunction. He would like to try this again.     2. Asthma. His asthma is well-controlled using prn albuterol, Singulair, and Flonase. He does need refills today.     Review of Systems   Constitutional, HEENT, cardiovascular, pulmonary, GI, , musculoskeletal, neuro, skin, endocrine and psych systems are negative, except as otherwise noted.      Objective    Vitals - Patient Reported  Pain Score: No Pain (0)      Vitals:  No vitals were obtained today due to virtual visit.    Physical Exam   healthy, alert and no distress  PSYCH: Alert and oriented times 3; coherent speech, normal   rate and volume, able to articulate logical thoughts, able   to abstract reason, no tangential thoughts, no hallucinations   or delusions  His affect is normal  RESP: No cough, no audible wheezing, able to talk in full sentences  Remainder of exam unable to be completed due to telephone visits                Phone call duration: 9 minutes

## 2021-08-03 ASSESSMENT — ASTHMA QUESTIONNAIRES: ACT_TOTALSCORE: 24

## 2021-08-03 ASSESSMENT — ANXIETY QUESTIONNAIRES: GAD7 TOTAL SCORE: 4

## 2021-09-19 ENCOUNTER — HEALTH MAINTENANCE LETTER (OUTPATIENT)
Age: 36
End: 2021-09-19

## 2021-12-06 ASSESSMENT — ANXIETY QUESTIONNAIRES
7. FEELING AFRAID AS IF SOMETHING AWFUL MIGHT HAPPEN: NOT AT ALL
GAD7 TOTAL SCORE: 3
3. WORRYING TOO MUCH ABOUT DIFFERENT THINGS: NOT AT ALL
GAD7 TOTAL SCORE: 3
4. TROUBLE RELAXING: SEVERAL DAYS
2. NOT BEING ABLE TO STOP OR CONTROL WORRYING: NOT AT ALL
6. BECOMING EASILY ANNOYED OR IRRITABLE: SEVERAL DAYS
5. BEING SO RESTLESS THAT IT IS HARD TO SIT STILL: NOT AT ALL
8. IF YOU CHECKED OFF ANY PROBLEMS, HOW DIFFICULT HAVE THESE MADE IT FOR YOU TO DO YOUR WORK, TAKE CARE OF THINGS AT HOME, OR GET ALONG WITH OTHER PEOPLE?: NOT DIFFICULT AT ALL
GAD7 TOTAL SCORE: 3
1. FEELING NERVOUS, ANXIOUS, OR ON EDGE: SEVERAL DAYS
7. FEELING AFRAID AS IF SOMETHING AWFUL MIGHT HAPPEN: NOT AT ALL

## 2021-12-07 ENCOUNTER — VIRTUAL VISIT (OUTPATIENT)
Dept: FAMILY MEDICINE | Facility: CLINIC | Age: 36
End: 2021-12-07
Payer: COMMERCIAL

## 2021-12-07 DIAGNOSIS — J30.2 SEASONAL ALLERGIC RHINITIS, UNSPECIFIED TRIGGER: ICD-10-CM

## 2021-12-07 DIAGNOSIS — F41.1 GENERALIZED ANXIETY DISORDER: ICD-10-CM

## 2021-12-07 DIAGNOSIS — J45.30 MILD PERSISTENT ALLERGIC ASTHMA: ICD-10-CM

## 2021-12-07 DIAGNOSIS — F43.23 ADJUSTMENT DISORDER WITH MIXED ANXIETY AND DEPRESSED MOOD: ICD-10-CM

## 2021-12-07 PROCEDURE — 99214 OFFICE O/P EST MOD 30 MIN: CPT | Mod: 95 | Performed by: FAMILY MEDICINE

## 2021-12-07 RX ORDER — BUPROPION HYDROCHLORIDE 150 MG/1
150 TABLET ORAL EVERY MORNING
Qty: 30 TABLET | Refills: 0 | Status: SHIPPED | OUTPATIENT
Start: 2021-12-07 | End: 2022-01-13

## 2021-12-07 RX ORDER — ESCITALOPRAM OXALATE 20 MG/1
10 TABLET ORAL DAILY
Qty: 90 TABLET | Refills: 1
Start: 2021-12-07 | End: 2022-01-13 | Stop reason: DRUGHIGH

## 2021-12-07 RX ORDER — MONTELUKAST SODIUM 10 MG/1
10 TABLET ORAL AT BEDTIME
Qty: 90 TABLET | Refills: 1 | Status: SHIPPED | OUTPATIENT
Start: 2021-12-07 | End: 2023-01-25

## 2021-12-07 ASSESSMENT — ANXIETY QUESTIONNAIRES: GAD7 TOTAL SCORE: 3

## 2021-12-07 NOTE — PROGRESS NOTES
"Shashi is a 36 year old who is being evaluated via a billable video visit.      How would you like to obtain your AVS? MyChart  If the video visit is dropped, the invitation should be resent by: Text to cell phone: [phone]  Will anyone else be joining your video visit? No  {If patient encounters technical issues they should call [phone] :918157}    Video Start Time: {video visit start/end time for provider to select:576933}    {PROVIDER CHARTING PREFERENCE:406968}    Subjective   Shashi is a 36 year old who presents for the following health issues {ACCOMPANIED BY STATEMENT (Optional):204749}    HPI     {SUPERLIST (Optional):477221}  {additonal problems for provider to add (Optional):391851}    Review of Systems   {ROS COMP (Optional):975626}      Objective       Answers for HPI/ROS submitted by the patient on 12/6/2021  NIKO 7 TOTAL SCORE: 3    Answers for HPI/ROS submitted by the patient on 12/6/2021  NIKO 7 TOTAL SCORE: 3        Vitals:  No vitals were obtained today due to virtual visit.    Physical Exam   {video visit exam brief selected:349722::\"GENERAL: Healthy, alert and no distress\",\"EYES: Eyes grossly normal to inspection.  No discharge or erythema, or obvious scleral/conjunctival abnormalities.\",\"RESP: No audible wheeze, cough, or visible cyanosis.  No visible retractions or increased work of breathing.  \",\"SKIN: Visible skin clear. No significant rash, abnormal pigmentation or lesions.\",\"NEURO: Cranial nerves grossly intact.  Mentation and speech appropriate for age.\",\"PSYCH: Mentation appears normal, affect normal/bright, judgement and insight intact, normal speech and appearance well-groomed.\"}    {Diagnostic Test Results (Optional):561531}    {AMBULATORY ATTESTATION (Optional):485974}        Video-Visit Details    Type of service:  Video Visit    Video End Time:{video visit start/end time for provider to select:145421}    Originating Location (pt. Location): {video visit patient " "location:355315::\"Home\"}    Distant Location (provider location):  [facility]     Platform used for Video Visit: {Virtual Visit Platforms:012881::\"[email or web address]\"}  "

## 2021-12-07 NOTE — PROGRESS NOTES
Shashi is a 36 year old who is being evaluated via a billable telephone visit.      What phone number would you like to be contacted at? [phone]  How would you like to obtain your AVS? Ned    Assessment & Plan     (F41.1) Generalized anxiety disorder  Comment:   Plan: escitalopram (LEXAPRO) 20 MG tablet, buPROPion         (WELLBUTRIN XL) 150 MG 24 hr tablet            (F43.23) Adjustment disorder with mixed anxiety and depressed mood  Comment:   Plan: escitalopram (LEXAPRO) 20 MG tablet, buPROPion         (WELLBUTRIN XL) 150 MG 24 hr tablet            Discussed cares, talked about signs and symptoms of anxiety/ depression and treatment options. Willing to try low-dose of Wellbutrin again.  He might gradually taper Lexapro down to 10 in the meantime, to see if the combination works any better.   . Pros/ cons of med's discussed . encouraged to see  to help and and he has read that he plans to schedule.   spent sometimes counseling patient.  Talked about healthy diet and exercise which may improve the depression and energy level as well.  He verbalized understanding.  follow up in 3 to 4 weeks but sooner if problem.     (J30.2) Seasonal allergic rhinitis, unspecified trigger  Comment: Stable  Plan: montelukast (SINGULAIR) 10 MG tablet            (J45.30) Mild persistent allergic asthma  Comment: Stable  Plan: montelukast (SINGULAIR) 10 MG tablet           refill sent.Cares and  treatment discussed.  He is planning to schedule for a physical has not been done for this year .  Patient expressed understanding and agreement with treatment plan. All patient's questions were answered, will let me know if has more later.  Medications: Rx's: Reviewed the potential side effects/complications of medications prescribed.       Malina Leija MD  [facility]    Varsha Danielle is a 36 year old who presents for the following health issues    History of Present Illness     Asthma:   He presents for follow up of asthma.  He has no cough, no wheezing, and some shortness of breath. He is using a relief medication a few times a month. He typically misses taking his controller medication 1 time(s) per week.Patient is aware of the following triggers: animal dander, cold air, gastric reflux, humidity, insects/rodents, mold, pollens, smoke and strong odors and fumes. The patient has not had a visit to the Emergency Room, Urgent Care or Hospital due to asthma since the last clinic visit.     Mental Health Follow-up:  Patient presents to follow-up on Anxiety.    Patient's anxiety since last visit has been:  Medium  The patient is not having other symptoms associated with anxiety.  Any significant life events: financial concerns and grief or loss  Patient is not feeling anxious or having panic attacks.  Patient has no concerns about alcohol or drug use.     Social History  Tobacco Use    Smoking status: Never Smoker    Smokeless tobacco: Never Used  Alcohol use: No  Drug use: No      Today's PHQ-9         PHQ-9 Total Score:         PHQ-9 Q9 Thoughts of better off dead/self-harm past 2 weeks :       Thoughts of suicide or self harm:      Self-harm Plan:        Self-harm Action:          Safety concerns for self or others:           He eats 0-1 servings of fruits and vegetables daily.He consumes 1 sweetened beverage(s) daily.He exercises with enough effort to increase his heart rate 30 to 60 minutes per day.  He exercises with enough effort to increase his heart rate 4 days per week.   He is taking medications regularly.       Medication Followup of lexapro    Taking Medication as prescribed: yes    Side Effects:  Gain weight, fatigue    Medication Helping Symptoms:  Is helping with anxiety         Anxiety depression follow-up   He thinks  mikie pro  does cause weight issue , but works better for anxiety , but no other problem taking the Lexapro.     Not exercising as much bc of knee and foot pain issues.   He is planning to start gym.  He has been on Wellbutrin in the past, that  gave him more energy, although it did not work as good for anxiety.  He had no problem taking the Wellbutrin, so willing    to try again.  Sleep is okay.  Sometimes take melatonin, also has trazodone that he takes as needed.  He was working with a therapist although has not seen one in a while, planning to schedule.     Asthma Follow-Up       Was ACT completed today?    NO   ACT Total Scores 8/2/2021   ACT TOTAL SCORE (Goal Greater than or Equal to 20) 24   In the past 12 months, how many times did you visit the emergency room for your asthma without being admitted to the hospital? 0   In the past 12 months, how many times were you hospitalized overnight because of your asthma? 0          How many days per week do you miss taking your asthma controller medication?  I do  have an asthma controller medication. Singulair helps with allergies/ asthma . Could use refill     Please describe any recent triggers for your asthma: None    Have you had any Emergency Room Visits, Urgent Care Visits, or Hospital Admissions since your last office visit?  No      Review of Systems   Constitutional, HEENT, cardiovascular, pulmonary, GI, , musculoskeletal, neuro, skin, endocrine and psych systems are negative, except as otherwise noted.      Objective           Vitals:  No vitals were obtained today due to virtual visit.    Physical Exam   healthy, alert and no distress  PSYCH: Alert and oriented times 3; coherent speech, normal   rate and volume, able to articulate logical thoughts, able   to abstract reason, no tangential thoughts, no hallucinations   or delusions  His affect is normal  RESP: No cough, no audible wheezing, able to talk in full sentences  Remainder of exam unable to be completed due to telephone visits        Phone call duration: *20  minutes

## 2021-12-08 NOTE — PATIENT INSTRUCTIONS
Patient Education     Mind-Body Therapy  Mind-body therapy is based on the belief that thoughts and physical health are closely connected. A person's attitudes, beliefs, and outlook can all affect physical health. And physical health also can impact mental and emotional well-being. By being aware of the connection and learning new ways to connect these areas, a person can learn to optimize their overall wellness and health.    Uniting mind and body  Mind-body therapy is a way to improve the link between mental and physical health. By doing so, you may find untapped resources within yourself that may enhance your general health and mental outlook.  The power of suggestion is key to this type of therapy. A therapist may give suggestions that can help you better unite mind with body. Some therapists use a biofeedback machine that uses sensory feedback from the body allowing first hand observation of the effect that the mind has on the physical body.  The way you receive the suggestion matters less than what it teaches you about how to relax. A relaxed mind and body are key to this therapy. In fact, enhanced relaxation is often a main goal of therapy.  Questions for the mind-body therapist  Before you decide whether to have mind-body therapy, talk with at least one professional who practices it. Asking him or her some of these questions may help you make an informed choice:    What is your training? How long have you been practicing?    What results have you had working with people who have problems like mine?    What will a typical visit be like?    How long will treatment take? How much will it cost?    Will my insurance cover my therapy?    How long will my results last?  Common choices in mind-body therapy    Biofeedback: Sensory feedback is used to help control body function.    Guided imagery: Suggestion or thought is used to enhance awareness.    Hypnosis: Suggestion or relaxation is used to help influence  "mental state.    Meditation and prayer: Thought or spiritual belief is used to improve health.    Progressive relaxation: Focused awareness of the body is used to reduce stress.    Yoga: Movement, breathing, and thought are used to improve well-being.    Resources  Research mind-body therapy in the library, on the Internet, or by contacting:    Association for Applied Psychophysiology and Biofeedback  www.aapb.org    The Academy for Guided Imagery   [employer]    Center for Mind-Body Medicine   www.cmbm.org    American Society of Clinical Hypnosis  www.asch.net  Elio last reviewed this educational content on 4/1/2018 2000-2021 [employer]. All rights reserved. This information is not intended as a substitute for professional medical care. Always follow your healthcare professional's instructions.           Patient Education       Stress Relief: Relaxation    Focusing the mind helps provide stress relief. Taking 5 to 10 minutes to practice relaxation each day helps you feel more refreshed. You can do these exercises almost anywhere. Try one or more until you find what works best for you.  Calm your mind  Find a quiet place where you won't be disturbed. Then try the following:    Sit comfortably. Take off your shoes. Turn off your cell phone. Take a few deep breaths.    Focus your mind on one peaceful thought, image, or word. Then try to hold that thought for 5 minutes.    When other thoughts enter your mind, relax and refocus. Let the invading thoughts fall away.    When you're done, stand up slowly and stretch your arms over your head. With practice, this exercise can help you feel restored.  Calm your body  With practice, you can use mental cues to tell your body how to feel.    Sit comfortably and clear your mind. A few deep breaths will help.    Mentally focus on your left hand and repeat to yourself, \"My left hand feels warm and heavy.\" Keep doing this until your hand does feel heavier and " "warmer.    Repeat the exercise using your right hand. Then focus on your arms, legs, and feet until your whole body feels relaxed.    When you're done, stand up slowly and stretch your arms overhead.  Visualization  Visualization is like taking a mental vacation. It frees your mind while keeping your body in a calm state. To get started, picture yourself feeling warm and relaxed. Choose a peaceful setting that appeals to you and fill in the details. If you imagine a tropical beach, listen to the waves on the shore. Feel the sun on your face. Dig your toes in the sand. By using the power of your mind, you can take a soothing break when you need to.  Other relaxing activities include yoga and jaqui chi. You can learn about these and other healthy and relaxing activities on the National Center for Complementary and Integrative Health (NCCIH) website at www.[facility] Clinicih.nih.gov/health/stress.   Elio last reviewed this educational content on 12/1/2019 2000-2021 [employer]. All rights reserved. This information is not intended as a substitute for professional medical care. Always follow your healthcare professional's instructions.           Patient Education   Tips for Sleep Hygiene  \"Sleep hygiene\" means having good sleep habits.Follow these tips to sleep better at night:     Get on a schedule. Go to bed and get up at about the same time every day.    Listen to your body. Only try to sleep when you actually feel tired or sleepy.    Be patient. If you haven't been able to get to sleep after about 30 minutes or more, get up and do something calming or boring until you feel sleepy. Then return to bed and try again.    Don't have caffeine (coffee, tea, cola drinks, chocolate and some medicines), alcohol or nicotine (cigarettes). These can make it harder for you to fall asleep and stay asleep.    Use your bed for sleeping only. That means no TV, computer or homework in bed, especially during the evening and " "before bedtime.    Don't nap during the day. If you must nap, make sure it is for less than 20 minutes.    Create sleep rituals that remind your body it is time to sleep. Examples include breathing exercises, stretching or reading a book.    Avoid all electronic media (smart phone, computer, tablet) within 2 hours of bed time. The \"blue light\" in these devices activates the part of the brain that keeps you awake.    Dim the lights at night.    Get early morning sources of light (walk in the sunshine) to help set sleep patterns at night.    Try a bath or shower before bed. Having a warm bath 1 to 2 hours before bedtime can help you feel sleepy. Hot baths can make you alert, so be mindful of the temperature.    Don't watch the clock. Checking the clock during the night can wake you up. It can also lead to negative thoughts such as, \"I will never fall asleep,\" which can increase anxiety and sleeplessness.    Use a sleep diary. Track your sleep schedule to know your sleep patterns and to see where you can improve.    Get regular exercise every day. Try not to do heavy exercise in the 4 hours before bedtime.    Eat a healthy, balanced diet.    Try eating a light, healthy snack before bed, but avoid eating a heavy meal.    Create the right sleeping area. A cool, dark, quiet room is best. If needed, try earplugs, fans and blackout curtains.    Keep your daytime routine the same even if you have a bad night sleep. Avoiding activities the next day can make it harder to sleep.  For informational purposes only. Not to replace the advice of your health care provider.   Copyright   2013 [employer]. All rights reserved. [employer] 835363 - 01/16.       "

## 2022-01-13 ENCOUNTER — VIRTUAL VISIT (OUTPATIENT)
Dept: FAMILY MEDICINE | Facility: CLINIC | Age: 37
End: 2022-01-13
Payer: COMMERCIAL

## 2022-01-13 DIAGNOSIS — F41.1 GENERALIZED ANXIETY DISORDER: ICD-10-CM

## 2022-01-13 DIAGNOSIS — J45.30 MILD PERSISTENT ALLERGIC ASTHMA: ICD-10-CM

## 2022-01-13 DIAGNOSIS — G47.00 INSOMNIA, UNSPECIFIED TYPE: ICD-10-CM

## 2022-01-13 DIAGNOSIS — J30.2 SEASONAL ALLERGIC RHINITIS, UNSPECIFIED TRIGGER: ICD-10-CM

## 2022-01-13 DIAGNOSIS — U07.1 INFECTION DUE TO 2019 NOVEL CORONAVIRUS: Primary | ICD-10-CM

## 2022-01-13 DIAGNOSIS — F43.23 ADJUSTMENT DISORDER WITH MIXED ANXIETY AND DEPRESSED MOOD: ICD-10-CM

## 2022-01-13 DIAGNOSIS — N52.9 ERECTILE DYSFUNCTION, UNSPECIFIED ERECTILE DYSFUNCTION TYPE: ICD-10-CM

## 2022-01-13 PROCEDURE — 99214 OFFICE O/P EST MOD 30 MIN: CPT | Mod: 95 | Performed by: FAMILY MEDICINE

## 2022-01-13 RX ORDER — SILDENAFIL 100 MG/1
100 TABLET, FILM COATED ORAL DAILY PRN
Qty: 30 TABLET | Refills: 1 | Status: SHIPPED | OUTPATIENT
Start: 2022-01-13

## 2022-01-13 RX ORDER — ESCITALOPRAM OXALATE 20 MG/1
10 TABLET ORAL DAILY
Qty: 90 TABLET | Refills: 1 | Status: CANCELLED | OUTPATIENT
Start: 2022-01-13

## 2022-01-13 RX ORDER — BUPROPION HYDROCHLORIDE 150 MG/1
150 TABLET ORAL EVERY MORNING
Qty: 90 TABLET | Refills: 1 | Status: SHIPPED | OUTPATIENT
Start: 2022-01-13 | End: 2023-01-25

## 2022-01-13 RX ORDER — TRAZODONE HYDROCHLORIDE 50 MG/1
50 TABLET, FILM COATED ORAL
Qty: 90 TABLET | Refills: 0 | Status: SHIPPED | OUTPATIENT
Start: 2022-01-13 | End: 2022-09-07

## 2022-01-13 RX ORDER — ESCITALOPRAM OXALATE 10 MG/1
10 TABLET ORAL DAILY
Qty: 90 TABLET | Refills: 1 | Status: SHIPPED | OUTPATIENT
Start: 2022-01-13 | End: 2023-01-25

## 2022-01-13 RX ORDER — FLUTICASONE PROPIONATE 50 MCG
2 SPRAY, SUSPENSION (ML) NASAL DAILY
Qty: 15.8 ML | Refills: 4 | Status: SHIPPED | OUTPATIENT
Start: 2022-01-13 | End: 2022-07-25

## 2022-01-13 RX ORDER — TRAZODONE HYDROCHLORIDE 100 MG/1
100 TABLET ORAL AT BEDTIME
Qty: 90 TABLET | Refills: 1 | Status: CANCELLED | OUTPATIENT
Start: 2022-01-13

## 2022-01-13 RX ORDER — MOMETASONE FUROATE AND FORMOTEROL FUMARATE DIHYDRATE 200; 5 UG/1; UG/1
2 AEROSOL RESPIRATORY (INHALATION) 2 TIMES DAILY
Qty: 13 G | Refills: 4 | Status: SHIPPED | OUTPATIENT
Start: 2022-01-13 | End: 2022-07-25

## 2022-01-13 NOTE — PROGRESS NOTES
Shashi is a 36 year old who is being evaluated via a billable video visit.      How would you like to obtain your AVS? MyChart  If the video visit is dropped, the invitation should be resent by: Text to cell phone: [phone]  Will anyone else be joining your video visit? No      Video Start Time: 2:14 PM    Assessment & Plan       (F43.23) Adjustment disorder with mixed anxiety and depressed mood  Comment:   Plan: buPROPion (WELLBUTRIN XL) 150 MG 24 hr tablet            (G47.00) Insomnia, unspecified type  Comment: improved, needing less trazodone dose and infrequent   Plan: traZODone (DESYREL) 50 MG tablet            (F41.1) Generalized anxiety disorder  Comment: improved and stable   Plan: buPROPion (WELLBUTRIN XL) 150 MG 24 hr tablet,         escitalopram (LEXAPRO) 10 MG tablet            Discussed cares, talked about signs and symptoms of anxiety/ depression and treatment options. Doing much better after adding Wellbutrin. Willing to continue low dose of lexapro ( he is debating to even consider going further down on it when he feels ready . trazodone is also down to 50 mg  - needing  infrequently  now  . Pros/ cons of med's discussed.  spent sometimes counseling patient. Follow up in 4-6  months, sooner if problem.       (N52.9) Erectile dysfunction, unspecified erectile dysfunction type  Comment: stable , works well, he may try to taper the dose   Plan: sildenafil (VIAGRA) 100 MG tablet              (U07.1) Infection due to 2019 novel coronavirus  (primary encounter diagnosis)  Comment: 01/01/22 - seen in ER-   Plan:  clinically IMPROVING   Cares and symptomatic treatment discussed follow up if problem         (J45.30) Mild persistent allergic asthma  Comment: improved since recent COVID illness and stable   Plan: mometasone-formoterol (DULERA) 200-5 MCG/ACT         inhaler            (J30.2) Seasonal allergic rhinitis, unspecified trigger  Comment: NEED REFILL  Plan: fluticasone (FLONASE) 50 MCG/ACT nasal  spray            Check labs. refill sent.Cares and  treatment discussed.  follow up if problem   Patient expressed understanding and agreement with treatment plan. All patient's questions were answered, will let me know if has more later.  Medications: Rx's: Reviewed the potential side effects/complications of medications prescribed.       Malina Leija MD  [facility] RONALD Danielle is a 36 year old who presents for the following health issues     HPI     Depression and Anxiety Follow-Up    How are you doing with your depression since your last visit? Improved on Wellbutrin.  it has helped a lot and he is gone down on mikie pro to 10 mg now and over all mood and anxiety is much better.     feeling more energy and exercising more.  his sex drive is also bit better now, but  still need his Viagra sometimes. so could use refill on his med's .     Takes trazodone for sleep but not every night, and it depends on the shift work, may be 2-3 x per week . Also  he is taking only 50 mg now and it works well.  no problem with any of these med's     How are you doing with your anxiety since your last visit?  Improved     Are you having other symptoms that might be associated with depression or anxiety? No    Have you had a significant life event? No     Do you have any concerns with your use of alcohol or other drugs? No    Social History     Tobacco Use     Smoking status: Never Smoker     Smokeless tobacco: Never Used   Substance Use Topics     Alcohol use: No     Drug use: No     PHQ 7/15/2020 8/18/2020 4/8/2021   PHQ-9 Total Score 6 4 7   Q9: Thoughts of better off dead/self-harm past 2 weeks Not at all Not at all Not at all     NIKO-7 SCORE 4/8/2021 8/2/2021 12/6/2021   Total Score - - 3 (minimal anxiety)   Total Score 14 4 3     Last PHQ-9 4/8/2021   1.  Little interest or pleasure in doing things 1   2.  Feeling down, depressed, or hopeless 1   3.  Trouble falling or staying  asleep, or sleeping too much 1   4.  Feeling tired or having little energy 1   5.  Poor appetite or overeating 0   6.  Feeling bad about yourself 1   7.  Trouble concentrating 1   8.  Moving slowly or restless 1   Q9: Thoughts of better off dead/self-harm past 2 weeks 0   PHQ-9 Total Score 7   Difficulty at work, home, or with people Not difficult at all     NIKO-7  12/6/2021   1. Feeling nervous, anxious, or on edge 1   2. Not being able to stop or control worrying 0   3. Worrying too much about different things 0   4. Trouble relaxing 1   5. Being so restless that it is hard to sit still 0   6. Becoming easily annoyed or irritable 1   7. Feeling afraid, as if something awful might happen 0   NIKO-7 Total Score 3   If you checked any problems, how difficult have they made it for you to do your work, take care of things at home, or get along with other people? -       Suicide Assessment Five-step Evaluation and Treatment (SAFE-T)          Asthma Follow-Up    Was ACT completed today?  No  Was sick with COVID recently ans tested positive seen a ER . He is recovering well.  Still needing albuterol once or twice a week, could use refill.  He does not need any albuterol.  At time and his asthma is well controlled.     Do you have a cough?  YES but improving    Are you experiencing any wheezing in your chest?  No    Do you have any shortness of breath?  No     How often are you using a short-acting (rescue) inhaler or nebulizer, such as Albuterol?  Every other days,normally he did not really need much before he got sick     How many days per week do you miss taking your asthma controller medication?  0    Please describe any recent triggers for your asthma: upper respiratory infections and allergies     Have you had any Emergency Room Visits, Urgent Care Visits, or Hospital Admissions since your last office visit?  Yes  Number of ER or Urgent Care visits for asthma: just last week bc of covid       Review of Systems    Constitutional, HEENT, cardiovascular, pulmonary, GI, , musculoskeletal, neuro, skin, endocrine and psych systems are negative, except as otherwise noted.      Objective           Vitals:  No vitals were obtained today due to virtual visit.    Physical Exam   GENERAL: Healthy, alert and no distress  EYES: Eyes grossly normal to inspection.  No  obvious scleral/conjunctival abnormalities.  RESP: No audible wheeze, cough, or visible cyanosis.  No visible retractions or increased work of breathing.    SKIN: Visible skin clear. No significant rash,   NEURO: Cranial nerves grossly intact.  Mentation and speech appropriate for age.  PSYCH: Mentation appears normal, affect normal/bright, judgement and insight intact, normal speech and appearance well-groomed.            Video-Visit Details    Type of service:  Video Visit    Video End Time:2:37 PM    Originating Location (pt. Location): Home    Distant Location (provider location):  [facility]     Platform used for Video Visit: Trivop

## 2022-03-06 ENCOUNTER — HEALTH MAINTENANCE LETTER (OUTPATIENT)
Age: 37
End: 2022-03-06

## 2022-04-05 ENCOUNTER — MYC REFILL (OUTPATIENT)
Dept: FAMILY MEDICINE | Facility: CLINIC | Age: 37
End: 2022-04-05
Payer: COMMERCIAL

## 2022-04-05 DIAGNOSIS — F41.0 PANIC ATTACK: ICD-10-CM

## 2022-04-05 RX ORDER — ALPRAZOLAM 0.5 MG
.5-1 TABLET ORAL 2 TIMES DAILY PRN
Qty: 20 TABLET | Refills: 0 | OUTPATIENT
Start: 2022-04-05

## 2022-04-05 NOTE — TELEPHONE ENCOUNTER
Alprazolam 0.5 mg      Last Written Prescription Date:  8/2/21  Last Fill Quantity: 20,   # refills: 0  Last Office Visit: 1/13/22 jorge Leija  Future Office visit:       Routing refill request to provider for review/approval because:  Drug not on the [facility], [facility] or [facility] refill protocol or controlled substance    Mary RODRIGUEZ RN  EP Triage

## 2022-04-05 NOTE — TELEPHONE ENCOUNTER
Patient no showed appt today (I have never seen him before). Last person to fill this medication for him is no longer at [facility]. He will need an in person appt with a new provider in order for this medication and his asthma medications to be filled.    Soha Zimmerman PA-C

## 2022-04-07 ENCOUNTER — VIRTUAL VISIT (OUTPATIENT)
Dept: FAMILY MEDICINE | Facility: CLINIC | Age: 37
End: 2022-04-07
Payer: COMMERCIAL

## 2022-04-07 DIAGNOSIS — J45.30 MILD PERSISTENT ASTHMA WITHOUT COMPLICATION: ICD-10-CM

## 2022-04-07 DIAGNOSIS — F43.23 ADJUSTMENT DISORDER WITH MIXED ANXIETY AND DEPRESSED MOOD: Primary | ICD-10-CM

## 2022-04-07 PROCEDURE — 99213 OFFICE O/P EST LOW 20 MIN: CPT | Mod: 95 | Performed by: FAMILY MEDICINE

## 2022-04-07 PROCEDURE — 96127 BRIEF EMOTIONAL/BEHAV ASSMT: CPT | Mod: 95 | Performed by: FAMILY MEDICINE

## 2022-04-07 RX ORDER — BUPROPION HYDROCHLORIDE 150 MG/1
150 TABLET ORAL EVERY MORNING
Qty: 30 TABLET | Refills: 3 | Status: SHIPPED | OUTPATIENT
Start: 2022-04-07 | End: 2022-11-18

## 2022-04-07 RX ORDER — FLUTICASONE PROPIONATE 110 UG/1
1 AEROSOL, METERED RESPIRATORY (INHALATION) 2 TIMES DAILY
Qty: 18 G | Refills: 3 | Status: SHIPPED | OUTPATIENT
Start: 2022-04-07 | End: 2022-07-25

## 2022-04-07 RX ORDER — ESCITALOPRAM OXALATE 5 MG/1
5 TABLET ORAL DAILY
Qty: 30 TABLET | Refills: 3 | Status: SHIPPED | OUTPATIENT
Start: 2022-04-07 | End: 2023-01-25

## 2022-04-07 ASSESSMENT — ANXIETY QUESTIONNAIRES
1. FEELING NERVOUS, ANXIOUS, OR ON EDGE: NOT AT ALL
3. WORRYING TOO MUCH ABOUT DIFFERENT THINGS: NOT AT ALL
IF YOU CHECKED OFF ANY PROBLEMS ON THIS QUESTIONNAIRE, HOW DIFFICULT HAVE THESE PROBLEMS MADE IT FOR YOU TO DO YOUR WORK, TAKE CARE OF THINGS AT HOME, OR GET ALONG WITH OTHER PEOPLE: NOT DIFFICULT AT ALL
2. NOT BEING ABLE TO STOP OR CONTROL WORRYING: NOT AT ALL
7. FEELING AFRAID AS IF SOMETHING AWFUL MIGHT HAPPEN: NOT AT ALL
GAD7 TOTAL SCORE: 0
6. BECOMING EASILY ANNOYED OR IRRITABLE: NOT AT ALL
5. BEING SO RESTLESS THAT IT IS HARD TO SIT STILL: NOT AT ALL

## 2022-04-07 ASSESSMENT — PATIENT HEALTH QUESTIONNAIRE - PHQ9
5. POOR APPETITE OR OVEREATING: NOT AT ALL
SUM OF ALL RESPONSES TO PHQ QUESTIONS 1-9: 3

## 2022-04-07 ASSESSMENT — PAIN SCALES - GENERAL: PAINLEVEL: NO PAIN (0)

## 2022-04-07 NOTE — PROGRESS NOTES
Shashi is a 36 year old who is being evaluated via a billable telephone visit.      What phone number would you like to be contacted at? [phone]  How would you like to obtain your AVS? MyChart    Assessment & Plan     Adjustment disorder with mixed anxiety and depressed mood  Medication faxed  - buPROPion (WELLBUTRIN XL) 150 MG 24 hr tablet; Take 1 tablet (150 mg) by mouth every morning  - escitalopram (LEXAPRO) 5 MG tablet; Take 1 tablet (5 mg) by mouth daily    Mild persistent asthma without complication  - fluticasone (FLOVENT HFA) 110 MCG/ACT inhaler; Inhale 1 puff into the lungs 2 times daily    [id number]}     FUTURE APPOINTMENTS:       - Follow-up visit in one month.    Return in about 4 weeks (around 5/5/2022).    Ke Garcia MD  [facility]   Shashi is a 36 year old who presents for the following health issues     HPI 36-year-old male with history of asthma here for refills.  He reports his inhaler has been expensive and he will like to switch to Fluticasone, he was on this medication before.  He also needs refills on his antidepressant and anxiety medication.  Reports that they have  been working effectively for him.  No other complaints today.      History of Present Illness     Asthma:  He presents for follow up of asthma.  He has no cough, some wheezing, and some shortness of breath. He is using a relief medication 2-3 times per day. He typically misses taking his controller medication 7 time(s) per week.Patient is aware of the following triggers: animal dander, aspirin, dust mites, exercise or sports, gastric reflux, humidity, insects/rodents, mold, occupational exposure, pollens, smoke, strong odors and fumes and upper respiratory infections. The patient has not had a visit to the Emergency Room, Urgent Care or Hospital due to asthma since the last clinic visit.     He eats 0-1 servings of fruits and vegetables daily.He consumes 1 sweetened beverage(s)  daily.He exercises with enough effort to increase his heart rate 20 to 29 minutes per day.  He exercises with enough effort to increase his heart rate 4 days per week. He is missing 2 dose(s) of medications per week.    Depression and Anxiety Follow-Up    How are you doing with your depression since your last visit? Improved has more energy now    How are you doing with your anxiety since your last visit?  Improved     Are you having other symptoms that might be associated with depression or anxiety? No    Have you had a significant life event? No     Do you have any concerns with your use of alcohol or other drugs? No    Social History     Tobacco Use     Smoking status: Never Smoker     Smokeless tobacco: Never Used   Vaping Use     Vaping Use: Never used   Substance Use Topics     Alcohol use: No     Drug use: No     PHQ 8/18/2020 4/8/2021 4/7/2022   PHQ-9 Total Score 4 7 3   Q9: Thoughts of better off dead/self-harm past 2 weeks Not at all Not at all Not at all     NIKO-7 SCORE 8/2/2021 12/6/2021 4/7/2022   Total Score - 3 (minimal anxiety) -   Total Score 4 3 0     Last PHQ-9 4/7/2022   1.  Little interest or pleasure in doing things 1   2.  Feeling down, depressed, or hopeless 1   3.  Trouble falling or staying asleep, or sleeping too much 0   4.  Feeling tired or having little energy 1   5.  Poor appetite or overeating 0   6.  Feeling bad about yourself 0   7.  Trouble concentrating 0   8.  Moving slowly or restless 0   Q9: Thoughts of better off dead/self-harm past 2 weeks 0   PHQ-9 Total Score 3   Difficulty at work, home, or with people Not difficult at all     NIKO-7  4/7/2022   1. Feeling nervous, anxious, or on edge 0   2. Not being able to stop or control worrying 0   3. Worrying too much about different things 0   4. Trouble relaxing 0   5. Being so restless that it is hard to sit still 0   6. Becoming easily annoyed or irritable 0   7. Feeling afraid, as if something awful might happen 0   NIKO-7 Total  Score 0   If you checked any problems, how difficult have they made it for you to do your work, take care of things at home, or get along with other people? Not difficult at all       Suicide Assessment Five-step Evaluation and Treatment (SAFE-T)    Medication Followup of lexapro    Taking Medication as prescribed: yes    Side Effects:  None    Medication Helping Symptoms:  Yes but he wants to decrease the dose and take only half of what he currently is     Medication Followup of dulera    Taking Medication as prescribed: yes    Side Effects:  None    Medication Helping Symptoms:  Yes but is wondering if there is something that he can take instead because this is too expensive for him. He said it's $300.00     Medication Followup of wellbutrin    Taking Medication as prescribed: yes    Side Effects:  None    Medication Helping Symptoms:  yes            Review of Systems   Constitutional, HEENT, cardiovascular, pulmonary, gi and gu systems are negative, except as otherwise noted.      Objective    Vitals - Patient Reported  Pain Score: No Pain (0)      Vitals:  No vitals were obtained today due to virtual visit.    Physical Exam   healthy, alert and no distress  PSYCH: Alert and oriented times 3; coherent speech, normal   rate and volume, able to articulate logical thoughts, able   to abstract reason, no tangential thoughts, no hallucinations   or delusions  His affect is normal  RESP: No cough, no audible wheezing, able to talk in full sentences  Remainder of exam unable to be completed due to telephone visits            Phone call duration: 6 minutes

## 2022-04-08 ASSESSMENT — ANXIETY QUESTIONNAIRES: GAD7 TOTAL SCORE: 0

## 2022-07-25 ENCOUNTER — VIRTUAL VISIT (OUTPATIENT)
Dept: FAMILY MEDICINE | Facility: CLINIC | Age: 37
End: 2022-07-25
Payer: COMMERCIAL

## 2022-07-25 DIAGNOSIS — J45.30 MILD PERSISTENT ALLERGIC ASTHMA: ICD-10-CM

## 2022-07-25 PROCEDURE — 99214 OFFICE O/P EST MOD 30 MIN: CPT | Mod: 95 | Performed by: PHYSICIAN ASSISTANT

## 2022-07-25 RX ORDER — MONTELUKAST SODIUM 10 MG/1
10 TABLET ORAL DAILY
Qty: 90 TABLET | Refills: 3 | Status: SHIPPED | OUTPATIENT
Start: 2022-07-25 | End: 2023-01-25

## 2022-07-25 RX ORDER — ALBUTEROL SULFATE 90 UG/1
AEROSOL, METERED RESPIRATORY (INHALATION)
Qty: 18 G | Refills: 3 | Status: SHIPPED | OUTPATIENT
Start: 2022-07-25 | End: 2023-08-09

## 2022-07-25 RX ORDER — FLUTICASONE PROPIONATE AND SALMETEROL 113; 14 UG/1; UG/1
POWDER, METERED RESPIRATORY (INHALATION)
COMMUNITY
Start: 2021-02-26 | End: 2022-07-25

## 2022-07-25 RX ORDER — MONTELUKAST SODIUM 10 MG/1
10 TABLET ORAL DAILY
COMMUNITY
Start: 2021-02-26 | End: 2022-07-25

## 2022-07-25 RX ORDER — FLUTICASONE PROPIONATE 50 MCG
1 SPRAY, SUSPENSION (ML) NASAL DAILY
Qty: 16 G | Refills: 3 | Status: SHIPPED | OUTPATIENT
Start: 2022-07-25 | End: 2023-01-25

## 2022-07-25 RX ORDER — PREDNISONE 20 MG/1
40 TABLET ORAL DAILY
Qty: 10 TABLET | Refills: 0 | Status: SHIPPED | OUTPATIENT
Start: 2022-07-25 | End: 2022-07-30

## 2022-07-25 RX ORDER — FLUTICASONE PROPIONATE AND SALMETEROL XINAFOATE 115; 21 UG/1; UG/1
2 AEROSOL, METERED RESPIRATORY (INHALATION) 2 TIMES DAILY
Qty: 12 G | Refills: 5 | Status: SHIPPED | OUTPATIENT
Start: 2022-07-25 | End: 2023-01-25

## 2022-07-25 NOTE — PROGRESS NOTES
Shashi is a 37 year old who is being evaluated via a billable video visit.      How would you like to obtain your AVS? MyChart  If the video visit is dropped, the invitation should be resent by: Text to cell phone: [phone]  Will anyone else be joining your video visit? No      Assessment & Plan   Problem List Items Addressed This Visit    None     Visit Diagnoses     Mild persistent allergic asthma        Relevant Medications    albuterol (PROAIR HFA) 108 (90 Base) MCG/ACT inhaler    fluticasone-salmeterol (ADVAIR HFA) 115-21 MCG/ACT inhaler    fluticasone (FLONASE) 50 MCG/ACT nasal spray    montelukast (SINGULAIR) 10 MG tablet    predniSONE (DELTASONE) 20 MG tablet         Restart Advair   Albuterol was refilled   Restart Flonase nasal spray and Singulair   Prednisone only if develop exacerbation    16 minutes spent on the date of the encounter doing chart review, history and exam, documentation and further activities per the note           Return in about 1 week (around 8/1/2022), or if symptoms worsen or fail to improve.    Heather Thompson PA-C  [facility]   Shashi is a 37 year old presenting for the following health issues:  Asthma    History of Present Illness     Asthma:  He presents for follow up of asthma.  He has no cough, some wheezing, and some shortness of breath. He is using a relief medication daily. He typically misses taking his controller medication 3 time(s) per week.Patient is aware of the following triggers: same as previous visit. The patient has not had a visit to the Emergency Room, Urgent Care or Hospital due to asthma since the last clinic visit.     He eats 2-3 servings of fruits and vegetables daily.He consumes 1 sweetened beverage(s) daily.He exercises with enough effort to increase his heart rate 30 to 60 minutes per day.  He exercises with enough effort to increase his heart rate 4 days per week.   He is taking medications  regularly.       Asthma Follow-Up    Was ACT completed today?  No      Do you have a cough?  No    Are you experiencing any wheezing in your chest?  YES    Do you have any shortness of breath?  No     How often are you using a short-acting (rescue) inhaler or nebulizer, such as Albuterol?  A few times a week, but ran out     How many days per week do you miss taking your asthma controller medication?  0, patient reports that Fluticasone does not help    Please describe any recent triggers for your asthma: pollens    Have you had any Emergency Room Visits, Urgent Care Visits, or Hospital Admissions since your last office visit?  No    Patient would like to get Fluticasone with salmeterol again, plain fluticasone does not help    Review of Systems   Constitutional, HEENT, cardiovascular, pulmonary, gi and gu systems are negative, except as otherwise noted.      Objective           Vitals:  No vitals were obtained today due to virtual visit.    Physical Exam   Physical Exam   healthy, alert and no distress  PSYCH: Alert and oriented times 3; coherent speech, normal   rate and volume, able to articulate logical thoughts, able   to abstract reason, no tangential thoughts, no hallucinations   or delusions  His affect is normal  RESP: No cough, no audible wheezing, able to talk in full sentences  Remainder of exam unable to be completed due to telephone visits                  Phone Visit Details- 16 minutes       .  ..

## 2022-07-25 NOTE — PATIENT INSTRUCTIONS
At [facility], we strive to deliver an exceptional experience to you, every time we see you. If you receive a survey, please complete it as we do value your feedback.  If you have MyChart, you can expect to receive results automatically within 24 hours of their completion.  Your provider will send a note interpreting your results as well.   If you do not have MyChart, you should receive your results in about a week by mail.    Your care team:                            Family Medicine Internal Medicine   MD Erick Bull MD Shantel Branch-Fleming, MD Srinivasa Vaka, MD Katya Belousova, SONY Siegel CNP, MD ([facility]   Gerard Carvalho, MD Kalie Villalpando MD Amelia Massimini APRN CNP Kim Thein, APRN CNP Bethany Templen, MD             Clinic hours: Monday - Thursday 7 am-6 pm; Fridays 7 am-5 pm.   Urgent care: Monday - Friday 10 am- 8 pm; Saturday and Sunday 9 am-5 pm.    Clinic: [phone]       [facility]: Monday - Thursday 8 am - 7 pm; Friday 8 am - 6 pm  [facility]: [phone]

## 2022-09-03 ENCOUNTER — MYC REFILL (OUTPATIENT)
Dept: FAMILY MEDICINE | Facility: CLINIC | Age: 37
End: 2022-09-03

## 2022-09-03 DIAGNOSIS — G47.00 INSOMNIA, UNSPECIFIED TYPE: ICD-10-CM

## 2022-09-07 RX ORDER — TRAZODONE HYDROCHLORIDE 50 MG/1
50 TABLET, FILM COATED ORAL
Qty: 90 TABLET | Refills: 0 | OUTPATIENT
Start: 2022-09-07

## 2022-10-19 ENCOUNTER — TELEPHONE (OUTPATIENT)
Dept: FAMILY MEDICINE | Facility: CLINIC | Age: 37
End: 2022-10-19

## 2022-10-19 NOTE — TELEPHONE ENCOUNTER
Patient Quality Outreach      Summary:    Patient has the following on his problem list/HM:   Asthma review       ACT Total Scores 8/2/2021   ACT TOTAL SCORE (Goal Greater than or Equal to 20) 24   In the past 12 months, how many times did you visit the emergency room for your asthma without being admitted to the hospital? 0   In the past 12 months, how many times were you hospitalized overnight because of your asthma? 0          Patient is due/failing the following:   Asthma  -  ACT needed    Type of outreach:    Sent damntheradio message.    Questions for provider review:    None                                                                                                                                     Minh RENO CMA       Chart routed to .

## 2022-11-16 DIAGNOSIS — F43.23 ADJUSTMENT DISORDER WITH MIXED ANXIETY AND DEPRESSED MOOD: ICD-10-CM

## 2022-11-18 RX ORDER — BUPROPION HYDROCHLORIDE 150 MG/1
150 TABLET ORAL EVERY MORNING
Qty: 30 TABLET | Refills: 1 | Status: SHIPPED | OUTPATIENT
Start: 2022-11-18 | End: 2023-01-25

## 2022-11-18 NOTE — TELEPHONE ENCOUNTER
Patient needs to establish with a provider closer to where he stays. Will not refill medication again. Too far from patient to be managing his medication

## 2022-11-18 NOTE — TELEPHONE ENCOUNTER
"Requested Prescriptions   Pending Prescriptions Disp Refills    buPROPion (WELLBUTRIN XL) 150 MG 24 hr tablet [Pharmacy Med Name: buPROPion HCL  mg 24 hr tablet, extended release (WELLBUTRIN XL)] 30 tablet 3     Sig: Take 1 tablet (150 mg) by mouth every morning       SSRIs Protocol Failed - 11/16/2022  5:34 PM        Failed - PHQ-9 score less than 5 in past 6 months     Please review last PHQ-9 score.           Failed - Recent (6 mo) or future (30 days) visit within the authorizing provider's specialty     Patient had office visit in the last 6 months or has a visit in the next 30 days with authorizing provider or within the authorizing provider's specialty.  See \"Patient Info\" tab in inbasket, or \"Choose Columns\" in Meds & Orders section of the refill encounter.            Passed - Medication is Bupropion     If the medication is Bupropion (Wellbutrin), and the patient is taking for smoking cessation; OK to refill.          Passed - Medication is active on med list        Passed - Patient is age 18 or older             "

## 2022-11-21 ENCOUNTER — HEALTH MAINTENANCE LETTER (OUTPATIENT)
Age: 37
End: 2022-11-21

## 2022-11-21 NOTE — TELEPHONE ENCOUNTER
Left message to call clinic back.  Lorraine Delgado [facility] on 11/21/2022 at 10:01 AM

## 2023-01-24 NOTE — TELEPHONE ENCOUNTER
Action    Action Taken Attempted to reach patient about records. LVM   Ami Gallardo on 1/24/2023 at 4:03 PM    2nd attempt to reach patient. No VM left  Ami Gallardo on 1/25/2023 at 8:17 AM         DIAGNOSIS: (RT knee) Knee Pain in right knee for months . hard to walk up and down stairs. its affecting my work. i work in law [employer]   APPOINTMENT DATE: 01/25/2023   NOTES STATUS DETAILS   OFFICE NOTE from referring provider N/A    OFFICE NOTE from other specialist N/A    DISCHARGE SUMMARY from hospital N/A    DISCHARGE REPORT from the ER N/A    OPERATIVE REPORT Internal 10/28/2015 Left Knee Arthroscopy, Proximal Tibial Osteotomy  06/14/2013 Left Knee arthroscopy, partial lateral menisectomy, Open Hardware Removal     EMG report N/A    MEDICATION LIST N/A    MRI N/A    DEXA (osteoporosis/bone health) N/A    CT SCAN N/A    XRAYS (IMAGES & REPORTS) N/A

## 2023-01-25 ENCOUNTER — PRE VISIT (OUTPATIENT)
Dept: ORTHOPEDICS | Facility: CLINIC | Age: 38
End: 2023-01-25

## 2023-01-25 ENCOUNTER — VIRTUAL VISIT (OUTPATIENT)
Dept: FAMILY MEDICINE | Facility: CLINIC | Age: 38
End: 2023-01-25
Payer: COMMERCIAL

## 2023-01-25 DIAGNOSIS — F41.1 GENERALIZED ANXIETY DISORDER: ICD-10-CM

## 2023-01-25 DIAGNOSIS — F43.23 ADJUSTMENT DISORDER WITH MIXED ANXIETY AND DEPRESSED MOOD: ICD-10-CM

## 2023-01-25 DIAGNOSIS — J30.2 SEASONAL ALLERGIC RHINITIS, UNSPECIFIED TRIGGER: ICD-10-CM

## 2023-01-25 DIAGNOSIS — J45.30 MILD PERSISTENT ALLERGIC ASTHMA: ICD-10-CM

## 2023-01-25 DIAGNOSIS — M25.561 RIGHT KNEE PAIN: Primary | ICD-10-CM

## 2023-01-25 DIAGNOSIS — G47.00 INSOMNIA, UNSPECIFIED TYPE: ICD-10-CM

## 2023-01-25 DIAGNOSIS — M25.561 ACUTE PAIN OF RIGHT KNEE: Primary | ICD-10-CM

## 2023-01-25 DIAGNOSIS — F41.0 PANIC ATTACK: ICD-10-CM

## 2023-01-25 PROCEDURE — 99214 OFFICE O/P EST MOD 30 MIN: CPT | Mod: GT | Performed by: PHYSICIAN ASSISTANT

## 2023-01-25 RX ORDER — LEVALBUTEROL TARTRATE 45 UG/1
2 AEROSOL, METERED ORAL EVERY 4 HOURS PRN
Qty: 15 G | Refills: 1 | Status: SHIPPED | OUTPATIENT
Start: 2023-01-25 | End: 2023-08-09

## 2023-01-25 RX ORDER — TRAZODONE HYDROCHLORIDE 50 MG/1
50 TABLET, FILM COATED ORAL
Qty: 90 TABLET | Refills: 1 | Status: SHIPPED | OUTPATIENT
Start: 2023-01-25 | End: 2023-10-30

## 2023-01-25 RX ORDER — MONTELUKAST SODIUM 10 MG/1
10 TABLET ORAL AT BEDTIME
Qty: 90 TABLET | Refills: 1 | Status: SHIPPED | OUTPATIENT
Start: 2023-01-25 | End: 2023-12-11

## 2023-01-25 RX ORDER — ESCITALOPRAM OXALATE 5 MG/1
5 TABLET ORAL DAILY
Qty: 90 TABLET | Refills: 1 | Status: SHIPPED | OUTPATIENT
Start: 2023-01-25 | End: 2023-08-09

## 2023-01-25 RX ORDER — BUPROPION HYDROCHLORIDE 300 MG/1
300 TABLET ORAL EVERY MORNING
Qty: 90 TABLET | Refills: 1 | Status: SHIPPED | OUTPATIENT
Start: 2023-01-25 | End: 2023-08-09

## 2023-01-25 RX ORDER — FLUTICASONE PROPIONATE AND SALMETEROL XINAFOATE 115; 21 UG/1; UG/1
2 AEROSOL, METERED RESPIRATORY (INHALATION) 2 TIMES DAILY
Qty: 12 G | Refills: 5 | Status: SHIPPED | OUTPATIENT
Start: 2023-01-25 | End: 2023-08-09

## 2023-01-25 RX ORDER — FLUTICASONE PROPIONATE 50 MCG
1 SPRAY, SUSPENSION (ML) NASAL DAILY
Qty: 16 G | Refills: 3 | Status: SHIPPED | OUTPATIENT
Start: 2023-01-25

## 2023-01-25 ASSESSMENT — ANXIETY QUESTIONNAIRES
GAD7 TOTAL SCORE: 8
1. FEELING NERVOUS, ANXIOUS, OR ON EDGE: SEVERAL DAYS
8. IF YOU CHECKED OFF ANY PROBLEMS, HOW DIFFICULT HAVE THESE MADE IT FOR YOU TO DO YOUR WORK, TAKE CARE OF THINGS AT HOME, OR GET ALONG WITH OTHER PEOPLE?: SOMEWHAT DIFFICULT
6. BECOMING EASILY ANNOYED OR IRRITABLE: SEVERAL DAYS
7. FEELING AFRAID AS IF SOMETHING AWFUL MIGHT HAPPEN: NOT AT ALL
5. BEING SO RESTLESS THAT IT IS HARD TO SIT STILL: SEVERAL DAYS
3. WORRYING TOO MUCH ABOUT DIFFERENT THINGS: SEVERAL DAYS
IF YOU CHECKED OFF ANY PROBLEMS ON THIS QUESTIONNAIRE, HOW DIFFICULT HAVE THESE PROBLEMS MADE IT FOR YOU TO DO YOUR WORK, TAKE CARE OF THINGS AT HOME, OR GET ALONG WITH OTHER PEOPLE: SOMEWHAT DIFFICULT
7. FEELING AFRAID AS IF SOMETHING AWFUL MIGHT HAPPEN: NOT AT ALL
2. NOT BEING ABLE TO STOP OR CONTROL WORRYING: SEVERAL DAYS
GAD7 TOTAL SCORE: 8
4. TROUBLE RELAXING: NEARLY EVERY DAY
GAD7 TOTAL SCORE: 8

## 2023-01-25 ASSESSMENT — ASTHMA QUESTIONNAIRES
QUESTION_1 LAST FOUR WEEKS HOW MUCH OF THE TIME DID YOUR ASTHMA KEEP YOU FROM GETTING AS MUCH DONE AT WORK, SCHOOL OR AT HOME: SOME OF THE TIME
QUESTION_2 LAST FOUR WEEKS HOW OFTEN HAVE YOU HAD SHORTNESS OF BREATH: ONCE A DAY
QUESTION_3 LAST FOUR WEEKS HOW OFTEN DID YOUR ASTHMA SYMPTOMS (WHEEZING, COUGHING, SHORTNESS OF BREATH, CHEST TIGHTNESS OR PAIN) WAKE YOU UP AT NIGHT OR EARLIER THAN USUAL IN THE MORNING: ONCE A WEEK
QUESTION_5 LAST FOUR WEEKS HOW WOULD YOU RATE YOUR ASTHMA CONTROL: SOMEWHAT CONTROLLED
EMERGENCY_ROOM_LAST_YEAR_TOTAL: ONE
ACT_TOTALSCORE: 17
QUESTION_4 LAST FOUR WEEKS HOW OFTEN HAVE YOU USED YOUR RESCUE INHALER OR NEBULIZER MEDICATION (SUCH AS ALBUTEROL): ONE OR TWO TIMES PER DAY
ACT_TOTALSCORE: 13

## 2023-01-25 ASSESSMENT — PATIENT HEALTH QUESTIONNAIRE - PHQ9: SUM OF ALL RESPONSES TO PHQ QUESTIONS 1-9: 6

## 2023-01-25 NOTE — PATIENT INSTRUCTIONS
At [facility], we strive to deliver an exceptional experience to you, every time we see you. If you receive a survey, please complete it as we do value your feedback.  If you have MyChart, you can expect to receive results automatically within 24 hours of their completion.  Your provider will send a note interpreting your results as well.   If you do not have MyChart, you should receive your results in about a week by mail.    Your care team:                            Family Medicine Internal Medicine   MD Erick Bull MD Shantel Branch-Fleming, MD Srinivasa Vaka, MD Katya Belousova, PASONY Irizarry CNP, MD ([facility]   Gerard Carvalho, MD Kalie Villalpando MD Amelia Massimini APRN HUMAIRA Ruelas APRN MD Darion Lacy MD          Clinic hours: Monday - Thursday 7 am-6 pm; Fridays 7 am-5 pm.   Urgent care: Monday - Friday 10 am- 8 pm; Saturday and Sunday 9 am-5 pm.    Clinic: [phone]       [facility]: Monday - Thursday 8 am - 7 pm; Friday 8 am - 6 pm  [facility]: [phone]

## 2023-01-25 NOTE — PROGRESS NOTES
Shashi is a 37 year old who is being evaluated via a billable video visit.      How would you like to obtain your AVS? MyChart  If the video visit is dropped, the invitation should be resent by: Text to cell phone: [phone]  Will anyone else be joining your video visit? No        Assessment & Plan   Problem List Items Addressed This Visit        Nervous and Auditory    Knee pain - Primary       Behavioral    Adjustment disorder with mixed anxiety and depressed mood    Relevant Medications    buPROPion (WELLBUTRIN XL) 300 MG 24 hr tablet    escitalopram (LEXAPRO) 5 MG tablet   Other Visit Diagnoses     Generalized anxiety disorder        Relevant Medications    buPROPion (WELLBUTRIN XL) 300 MG 24 hr tablet    escitalopram (LEXAPRO) 5 MG tablet    traZODone (DESYREL) 50 MG tablet    Panic attack        Relevant Medications    buPROPion (WELLBUTRIN XL) 300 MG 24 hr tablet    escitalopram (LEXAPRO) 5 MG tablet    traZODone (DESYREL) 50 MG tablet    Seasonal allergic rhinitis, unspecified trigger        Relevant Medications    montelukast (SINGULAIR) 10 MG tablet    fluticasone-salmeterol (ADVAIR HFA) 115-21 MCG/ACT inhaler    levalbuterol (XOPENEX HFA) 45 MCG/ACT inhaler    fluticasone (FLONASE) 50 MCG/ACT nasal spray    Mild persistent allergic asthma        Relevant Medications    montelukast (SINGULAIR) 10 MG tablet    fluticasone-salmeterol (ADVAIR HFA) 115-21 MCG/ACT inhaler    levalbuterol (XOPENEX HFA) 45 MCG/ACT inhaler    fluticasone (FLONASE) 50 MCG/ACT nasal spray    Insomnia, unspecified type        Relevant Medications    traZODone (DESYREL) 50 MG tablet         Increase Wellbutrin to 300 mg daily   Continue  Lexapro 5 mg daily   Continue Trazadone for sleep     Restart Advair and Singulair   Xopenex 2 puffs every 4 hours as needed  for asthma     Patient has follow up scheduled with ortho for right knee pain  Continue Tylenol   Tramadol for more severe pain-patient was warned about opioid SE and  dangers    25 minutes spent on the date of the encounter doing chart review, history and exam, documentation and further activities per the note           Return in about 1 month (around 2/25/2023).    Heather Thompson PA-C  [facility]    Varsha Danielle is a 37 year old, presenting for the following health issues:  Asthma, MH Follow Up, and Knee Pain      History of Present Illness     Asthma:  He presents for follow up of asthma.  He has no cough, some wheezing, and no shortness of breath. He is using a relief medication 2-3 times per day. He does not miss any doses of his controller medication throughout the week.Patient is aware of the following triggers: animal dander, aspirin, cold air, dust mites, exercise or sports, gastric reflux, humidity, insects/rodents, mold, occupational exposure, pollens, smoke and strong odors and fumes. The patient has not had a visit to the Emergency Room, Urgent Care or Hospital due to asthma since the last clinic visit.     Mental Health Follow-up:  Patient presents to follow-up on Anxiety.    Patient's anxiety since last visit has been:  Better  The patient is not having other symptoms associated with anxiety.  Any significant life events: job concerns and grief or loss  Patient is feeling anxious or having panic attacks.  Patient has no concerns about alcohol or drug use.    Reason for visit:  Medication, refill, and knee pain issues    He eats 2-3 servings of fruits and vegetables daily.He consumes 1 sweetened beverage(s) daily.He exercises with enough effort to increase his heart rate 30 to 60 minutes per day.  He exercises with enough effort to increase his heart rate 4 days per week.   He is taking medications regularly.  Today's NIKO-7 Score: 8         Depression and Anxiety Follow-Up    How are you doing with your depression since your last visit? Improved     How are you doing with your anxiety since your last visit?  Worsened     Are  you having other symptoms that might be associated with depression or anxiety? Yes:  cisco-7, phq-9    Have you had a significant life event? Job Concerns and Grief or Loss     Do you have any concerns with your use of alcohol or other drugs? No    Social History     Tobacco Use     Smoking status: Never     Smokeless tobacco: Never   Vaping Use     Vaping Use: Never used   Substance Use Topics     Alcohol use: No     Drug use: No     PHQ 4/8/2021 4/7/2022 1/25/2023   PHQ-9 Total Score 7 3 6   Q9: Thoughts of better off dead/self-harm past 2 weeks Not at all Not at all Not at all     CISCO-7 SCORE 12/6/2021 4/7/2022 1/25/2023   Total Score 3 (minimal anxiety) - 8 (mild anxiety)   Total Score 3 0 8     Last PHQ-9 1/25/2023   1.  Little interest or pleasure in doing things 0   2.  Feeling down, depressed, or hopeless 0   3.  Trouble falling or staying asleep, or sleeping too much 3   4.  Feeling tired or having little energy 3   5.  Poor appetite or overeating 0   6.  Feeling bad about yourself 0   7.  Trouble concentrating 0   8.  Moving slowly or restless 0   Q9: Thoughts of better off dead/self-harm past 2 weeks 0   PHQ-9 Total Score 6   Difficulty at work, home, or with people Not difficult at all     CISCO-7  1/25/2023   1. Feeling nervous, anxious, or on edge 1   2. Not being able to stop or control worrying 1   3. Worrying too much about different things 1   4. Trouble relaxing 3   5. Being so restless that it is hard to sit still 1   6. Becoming easily annoyed or irritable 1   7. Feeling afraid, as if something awful might happen 0   CISCO-7 Total Score 8   If you checked any problems, how difficult have they made it for you to do your work, take care of things at home, or get along with other people? Somewhat difficult       Suicide Assessment Five-step Evaluation and Treatment (SAFE-T)    Asthma Follow-Up    Was ACT completed today?    Yes    ACT Total Scores 1/25/2023   ACT TOTAL SCORE (Goal Greater than or Equal  to 20) 17   In the past 12 months, how many times did you visit the emergency room for your asthma without being admitted to the hospital? 0   In the past 12 months, how many times were you hospitalized overnight because of your asthma? 0       How many days per week do you miss taking your asthma controller medication?  7    Please describe any recent triggers for your asthma: cold air    Have you had any Emergency Room Visits, Urgent Care Visits, or Hospital Admissions since your last office visit?  No    Patient has ran out of InStaff    Concern - right knee pain  Onset: 1 month   Description: sharp shooting pain in the right knee , worse with going up or down the stairs . Patient denies shooting pain from back to feet, or numbness or tingling in the feet.   Intensity: moderate  Progression of Symptoms:  worsening  Accompanying Signs & Symptoms: left knee pain-patient has had surgery on left knee in the past  Previous history of similar problem: yes in left knee. Needed surgery to fix the meniscus   Precipitating factors:        Worsened by: stairs   Alleviating factors:        Improved by: rest, Tylenol   Therapies tried and outcome: Tylenol -helps       Review of Systems   Constitutional, HEENT, cardiovascular, pulmonary, gi and gu systems are negative, except as otherwise noted.      Objective           Vitals:  No vitals were obtained today due to virtual visit.    Physical Exam   GENERAL: Healthy, alert and no distress  EYES: Eyes grossly normal to inspection.  No discharge or erythema, or obvious scleral/conjunctival abnormalities.  RESP: No audible wheeze, cough, or visible cyanosis.  No visible retractions or increased work of breathing.    SKIN: Visible skin clear. No significant rash, abnormal pigmentation or lesions.  NEURO: Cranial nerves grossly intact.  Mentation and speech appropriate for age.  PSYCH: Mentation appears normal, affect normal/bright, judgement and insight intact, normal  speech and appearance well-groomed.                Video-Visit Details    Type of service:  Video Visit     Originating Location (pt. Location): Home  Distant Location (provider location):  Off-site  Platform used for Video Visit: Kong

## 2023-02-25 ENCOUNTER — HOSPITAL ENCOUNTER (EMERGENCY)
Facility: CLINIC | Age: 38
Discharge: HOME | End: 2023-02-25
Attending: EMERGENCY MEDICINE | Admitting: EMERGENCY MEDICINE
Payer: COMMERCIAL

## 2023-02-25 VITALS
SYSTOLIC BLOOD PRESSURE: 146 MMHG | RESPIRATION RATE: 18 BRPM | DIASTOLIC BLOOD PRESSURE: 76 MMHG | BODY MASS INDEX: 33.86 KG/M2 | TEMPERATURE: 98.1 F | OXYGEN SATURATION: 97 % | HEART RATE: 76 BPM | WEIGHT: 250 LBS | HEIGHT: 72 IN

## 2023-02-25 DIAGNOSIS — J02.0 STREPTOCOCCAL PHARYNGITIS: ICD-10-CM

## 2023-02-25 LAB
FLUAV RNA SPEC QL NAA+PROBE: NEGATIVE
FLUBV RNA RESP QL NAA+PROBE: NEGATIVE
GROUP A STREP BY PCR: DETECTED
RSV RNA SPEC NAA+PROBE: NEGATIVE
SARS-COV-2 RNA RESP QL NAA+PROBE: NEGATIVE

## 2023-02-25 PROCEDURE — C9803 HOPD COVID-19 SPEC COLLECT: HCPCS

## 2023-02-25 PROCEDURE — 87651 STREP A DNA AMP PROBE: CPT | Performed by: EMERGENCY MEDICINE

## 2023-02-25 PROCEDURE — 87637 SARSCOV2&INF A&B&RSV AMP PRB: CPT | Performed by: EMERGENCY MEDICINE

## 2023-02-25 PROCEDURE — 250N000013 HC RX MED GY IP 250 OP 250 PS 637: Performed by: EMERGENCY MEDICINE

## 2023-02-25 PROCEDURE — 99284 EMERGENCY DEPT VISIT MOD MDM: CPT | Mod: CS,25

## 2023-02-25 PROCEDURE — 96372 THER/PROPH/DIAG INJ SC/IM: CPT | Performed by: EMERGENCY MEDICINE

## 2023-02-25 PROCEDURE — 250N000011 HC RX IP 250 OP 636: Performed by: EMERGENCY MEDICINE

## 2023-02-25 RX ORDER — HYDROCODONE BITARTRATE AND ACETAMINOPHEN 5; 325 MG/1; MG/1
1 TABLET ORAL EVERY 6 HOURS PRN
Qty: 10 TABLET | Refills: 0 | Status: SHIPPED | OUTPATIENT
Start: 2023-02-25 | End: 2023-02-28

## 2023-02-25 RX ORDER — HYDROCODONE BITARTRATE AND ACETAMINOPHEN 5; 325 MG/1; MG/1
1 TABLET ORAL ONCE
Status: COMPLETED | OUTPATIENT
Start: 2023-02-25 | End: 2023-02-25

## 2023-02-25 RX ADMIN — HYDROCODONE BITARTRATE AND ACETAMINOPHEN 1 TABLET: 5; 325 TABLET ORAL at 20:28

## 2023-02-25 RX ADMIN — PENICILLIN G BENZATHINE 1.2 MILLION UNITS: 1200000 INJECTION, SUSPENSION INTRAMUSCULAR at 20:40

## 2023-02-25 ASSESSMENT — ACTIVITIES OF DAILY LIVING (ADL): ADLS_ACUITY_SCORE: 35

## 2023-02-26 NOTE — ED PROVIDER NOTES
EMERGENCY DEPARTMENT ENCOUNTER      NAME: Shashi Yates  AGE: 37 year old male  YOB: 1985  MRN: [id number]  EVALUATION DATE & TIME: 2/25/2023  7:50 PM    PCP: [facility] [place] PROVIDER: Gagandeep Tolbert M.D.      Chief Complaint   Patient presents with     Pharyngitis         FINAL IMPRESSION:  Strep pharyngitis      ED COURSE & MEDICAL DECISION MAKING:    Pertinent Labs & Imaging studies reviewed. (See chart for details)  37 year old male presents to the Emergency Department for evaluation of sore throat.  Symptoms ongoing for last few days.  Reports his son diagnosed with strep throat this last week.  Patient reports prior COVID vaccination and having COVID approximately a year ago.  Denies any cough or runny nose.  Examination reveals a well-nourished well-developed male mild distress.  Oropharynx is remarkable for tonsillar pillars and soft palate markedly injected.  No exudates.  Mild tenderness at the mandibular angles but no obvious adenopathy.  Remainder exam unremarkable.  Will swab for strep and for COVID/RSV/influenza.. Patient appears non toxic with stable vitals signs. Overall exam is benign.      7:54 PM I met with the patient for the initial interview and physical examination. Discussed plan for treatment and workup in the ED.    8:13 PM.  Strep screen positive.  Will discuss IM versus oral treatment.  Patient requesting IM therapy.  We will also give hydrocodone for discomfort  At the conclusion of the encounter I discussed the results of all of the tests and the disposition. The questions were answered and return precautions provided. The patient or family acknowledged understanding and was agreeable with the care plan.       PPE: Provider wore gloves, N95 mask, eye protection.     Medical Decision Making    History:    Supplemental history from: Documented in chart, if applicable    External Record(s) reviewed: Documented in chart, if applicable.    Work  Up:    Chart documentation includes differential considered and any EKGs or imaging independently interpreted by provider, where specified.    In additional to work up documented, I considered the following work up: Documented in chart, if applicable.    External consultation:    Discussion of management with another provider: Documented in chart, if applicable    Complicating factors:    Care impacted by chronic illness: N/A    Care affected by social determinants of health: N/A    Disposition considerations: Discharge. I prescribed additional prescription strength medication(s) as charted. N/A.    MEDICATIONS GIVEN IN THE EMERGENCY:  Medications - No data to display    NEW PRESCRIPTIONS STARTED AT TODAY'S ER VISIT  .ptmed       =================================================================    HPI    Patient information was obtained from: Patient    Use of Intrepreter: N/A        Shashi Yates is a 37 year old male with no recorded pertinent medical history who presents to the ED for evaluation of sore throat. Patient endorses a sore throat, subjective fevers, diffuse body aches for the past 2-3 days. Patient states his son had strep throat within the past week. Otherwise he denies a cough or rhinorrhea. No other reported complaints at this time. Patient states he has had COVID-19 within the past year and is vaccinated against COVID-19.      REVIEW OF SYSTEMS   Constitutional:  Positive for subjective fevers. Denies chills  HEENT: Positive for sore throat.  Respiratory:  Denies productive cough or increased work of breathing  Cardiovascular:  Denies chest pain, palpitations  GI:  Denies abdominal pain, nausea, vomiting, or change in bowel or bladder habits   Musculoskeletal: Positive for diffuse body aches. Denies any new muscle/joint swelling  Skin:  Denies rash   Neurologic:  Denies focal weakness  All systems negative except as marked.     PAST MEDICAL HISTORY:  Past Medical History:   Diagnosis Date      Allergy      Asthma      PONV (postoperative nausea and vomiting)      Primary localized osteoarthrosis, lower leg 1/27/2012     Unspecified asthma(493.90)        PAST SURGICAL HISTORY:  Past Surgical History:   Procedure Laterality Date     ARTHROSCOPY KNEE  6/14/2013    Procedure: ARTHROSCOPY KNEE;  Left Knee arthroscopy, partial lateral menisectomy, Open Hardware Removal  ;  Surgeon: Tiburcio Winkler MD;  [facility]     ARTHROSCOPY KNEE Left 10/28/2015    Procedure: ARTHROSCOPY KNEE;  Surgeon: Tiburcio Winkler MD;  [facility]     KNEE SURGERY       KNEE SURGERY  2003    surgery to correct bow leg     ORTHOPEDIC SURGERY  L knee     OSTEOTOMY TIBIA Left 10/28/2015    Procedure: OSTEOTOMY TIBIA;  Surgeon: Tiburcio Winkler MD;  [facility]     REMOVE HARDWARE KNEE  6/14/2013    Procedure: REMOVE HARDWARE KNEE;;  Surgeon: Tiburcio Winkler MD;  [facility]         CURRENT MEDICATIONS:    No current facility-administered medications for this encounter.    Current Outpatient Medications:      albuterol (PROAIR HFA) 108 (90 Base) MCG/ACT inhaler, INHALE 1 TO 2 PUFFS EVERY 4 TO 6 HOURS AS NEEDED, Disp: 18 g, Rfl: 3     ALPRAZolam (XANAX) 0.5 MG tablet, Take 1-2 tablets (0.5-1 mg) by mouth 2 times daily as needed for anxiety, Disp: 20 tablet, Rfl: 0     buPROPion (WELLBUTRIN XL) 300 MG 24 hr tablet, Take 1 tablet (300 mg) by mouth every morning, Disp: 90 tablet, Rfl: 1     CLARITIN 10 MG OR TABS, ONE DAILY, Disp: 31, Rfl: 3     escitalopram (LEXAPRO) 5 MG tablet, Take 1 tablet (5 mg) by mouth daily, Disp: 90 tablet, Rfl: 1     fexofenadine (ALLEGRA) 180 MG tablet, Take 1 tablet (180 mg) by mouth daily, Disp: 30 tablet, Rfl: 4     fluticasone (FLONASE) 50 MCG/ACT nasal spray, Spray 1 spray into both nostrils daily, Disp: 16 g, Rfl: 3     fluticasone-salmeterol (ADVAIR HFA) 115-21 MCG/ACT inhaler, Inhale 2 puffs into the lungs 2 times daily, Disp: 12 g, Rfl: 5     hydrOXYzine  (VISTARIL) 25 MG capsule, Take 1-4 capsules ( mg) by mouth 3 times daily as needed for anxiety, Disp: 60 capsule, Rfl: 1     levalbuterol (XOPENEX HFA) 45 MCG/ACT inhaler, Inhale 2 puffs into the lungs every 4 hours as needed for shortness of breath or wheezing, Disp: 15 g, Rfl: 1     montelukast (SINGULAIR) 10 MG tablet, Take 1 tablet (10 mg) by mouth At Bedtime, Disp: 90 tablet, Rfl: 1     sildenafil (VIAGRA) 100 MG tablet, Take 1 tablet (100 mg) by mouth daily as needed (erective dysfunction), Disp: 30 tablet, Rfl: 1     traZODone (DESYREL) 50 MG tablet, Take 1 tablet (50 mg) by mouth nightly as needed for sleep, Disp: 90 tablet, Rfl: 1    ALLERGIES:  Allergies   Allergen Reactions     Nsaids Nausea     Other reaction(s): Nausea Only  Abdominal pain, causes redness in both eyes  Abdominal pain, causes redness in both eyes         FAMILY HISTORY:  Family History   Problem Relation Age of Onset     Diabetes Father      Asthma Mother        SOCIAL HISTORY:   Social History     Socioeconomic History     Marital status: Single     Spouse name: None     Number of children: None     Years of education: None     Highest education level: None   Tobacco Use     Smoking status: Never     Smokeless tobacco: Never   Vaping Use     Vaping Use: Never used   Substance and Sexual Activity     Alcohol use: No     Drug use: No     Sexual activity: Yes     Partners: Female     Birth control/protection: Condom       VITALS:  Patient Vitals for the past 24 hrs:   BP Temp Temp src Pulse Resp SpO2 Height Weight   02/25/23 1937 (!) 146/76 98.1  F (36.7  C) Oral 76 18 97 % 1.829 m (6') 113.4 kg (250 lb)        PHYSICAL EXAM    Constitutional:  Awake, alert, in no apparent distress  HENT:  Normocephalic, Atraumatic. Bilateral external ears normal. Oropharynx moist. Nose normal. Diffuse erythema of the soft palette and tonsillar pillars, No exudate. Neck- Normal range of motion with no guarding, No midline cervical tenderness, Supple,  No stridor.   Eyes:  PERRL, EOMI with no signs of entrapment, Conjunctiva normal, No discharge.   Respiratory:  Normal breath sounds, No respiratory distress, No wheezing.    Cardiovascular:  Normal heart rate, Normal rhythm, No appreciable rubs or gallops.   GI:  Soft, No tenderness, No distension, No palpable masses  Musculoskeletal:  Intact distal pulses, No edema. Good range of motion in all major joints. No tenderness to palpation or major deformities noted.  Integument:  Warm, Dry, No erythema, No rash.   Neurologic:  Alert & oriented, Normal motor function, Normal sensory function, No focal deficits noted.   Psychiatric:  Affect normal, Judgment normal, Mood normal.     LAB:  All pertinent labs reviewed and interpreted.       Current Discharge Medication List      START taking these medications    Details   HYDROcodone-acetaminophen (NORCO) 5-325 MG tablet Take 1 tablet by mouth every 6 hours as needed for severe pain (7-10)  Qty: 10 tablet, Refills: 0             I, Ethan Garcia, am serving as a scribe to document services personally performed by Gagandeep Tolbert MD, based on my observation and the provider's statements to me. I, Gagandeep Tolbert MD attest that Ethan Garcia is acting in a scribe capacity, has observed my performance of the services and has documented them in accordance with my direction.    Gagandeep Tolbert M.D.  Emergency Medicine  [facility] EMERGENCY ROOM     Gagandeep Tolbert MD  02/25/23 2047

## 2023-02-26 NOTE — ED TRIAGE NOTES
Sore throat with body aches x 2 days  Denies cough or sob  Reports son recently had strep throat     Triage Assessment     Row Name 02/25/23 1940       Triage Assessment (Adult)    Airway WDL WDL       Respiratory WDL    Respiratory WDL WDL       Skin Circulation/Temperature WDL    Skin Circulation/Temperature WDL WDL       Cardiac WDL    Cardiac WDL WDL       Peripheral/Neurovascular WDL    Peripheral Neurovascular WDL WDL       Cognitive/Neuro/Behavioral WDL    Cognitive/Neuro/Behavioral WDL WDL

## 2023-04-16 ENCOUNTER — HEALTH MAINTENANCE LETTER (OUTPATIENT)
Age: 38
End: 2023-04-16

## 2023-05-03 ENCOUNTER — TELEPHONE (OUTPATIENT)
Dept: FAMILY MEDICINE | Facility: CLINIC | Age: 38
End: 2023-05-03
Payer: COMMERCIAL

## 2023-05-03 NOTE — LETTER
May 3, 2023    Shashi Yates  [address]   [place] [address]    Dear Shashi Yates,     At [facility] we care about your health and are committed to providing quality patient care.    Which includes staying current on preventive cancer screenings.  You can increase your chances of finding and treating cancers through regular screenings.      Our records indicate you may be due for the following preventive screening(s):    Asthma  -  AAP  Physical Preventive Adult Physical      Topic Date Due    Pneumococcal Vaccine (1 - PCV) Never done    Hepatitis B Vaccine (2 of 3 - 19+ 3-dose series) 08/02/2013    Diptheria Tetanus Pertussis (DTAP/TDAP/TD) Vaccine (6 - Td or Tdap) 06/01/2020    COVID-19 Vaccine (3 - Booster for Pfizer series) 03/17/2021    Flu Vaccine (1) 09/01/2022     To schedule an appointment or discuss this screening further, you may contact us by phone at the [facility] at [phone] or online through the patient [email or web address] @ [email or web address]    If you have had any of the screenings listed above at another facility, please call us so that we may update your chart.      Your partners in health,      Quality Committee at [facility]

## 2023-05-03 NOTE — TELEPHONE ENCOUNTER
Patient Quality Outreach    Patient is due for the following:   Asthma  -  AAP  Physical Preventive Adult Physical      Topic Date Due     Pneumococcal Vaccine (1 - PCV) Never done     Hepatitis B Vaccine (2 of 3 - 19+ 3-dose series) 08/02/2013     Diptheria Tetanus Pertussis (DTAP/TDAP/TD) Vaccine (6 - Td or Tdap) 06/01/2020     COVID-19 Vaccine (3 - Booster for Pfizer series) 03/17/2021     Flu Vaccine (1) 09/01/2022       Next Steps:   Schedule a Adult Preventative    Type of outreach:    Sent [employer] message.      Questions for provider review:    None     Mana Munoz MA

## 2023-08-08 ASSESSMENT — ANXIETY QUESTIONNAIRES
4. TROUBLE RELAXING: SEVERAL DAYS
GAD7 TOTAL SCORE: 5
1. FEELING NERVOUS, ANXIOUS, OR ON EDGE: SEVERAL DAYS
2. NOT BEING ABLE TO STOP OR CONTROL WORRYING: SEVERAL DAYS
6. BECOMING EASILY ANNOYED OR IRRITABLE: SEVERAL DAYS
GAD7 TOTAL SCORE: 5
5. BEING SO RESTLESS THAT IT IS HARD TO SIT STILL: NOT AT ALL
IF YOU CHECKED OFF ANY PROBLEMS ON THIS QUESTIONNAIRE, HOW DIFFICULT HAVE THESE PROBLEMS MADE IT FOR YOU TO DO YOUR WORK, TAKE CARE OF THINGS AT HOME, OR GET ALONG WITH OTHER PEOPLE: NOT DIFFICULT AT ALL
7. FEELING AFRAID AS IF SOMETHING AWFUL MIGHT HAPPEN: NOT AT ALL
3. WORRYING TOO MUCH ABOUT DIFFERENT THINGS: SEVERAL DAYS

## 2023-08-08 ASSESSMENT — ASTHMA QUESTIONNAIRES: ACT_TOTALSCORE: 15

## 2023-08-09 ENCOUNTER — TELEPHONE (OUTPATIENT)
Dept: FAMILY MEDICINE | Facility: CLINIC | Age: 38
End: 2023-08-09

## 2023-08-09 ENCOUNTER — VIRTUAL VISIT (OUTPATIENT)
Dept: FAMILY MEDICINE | Facility: CLINIC | Age: 38
End: 2023-08-09
Payer: COMMERCIAL

## 2023-08-09 DIAGNOSIS — F43.23 ADJUSTMENT DISORDER WITH MIXED ANXIETY AND DEPRESSED MOOD: ICD-10-CM

## 2023-08-09 DIAGNOSIS — N52.9 ERECTILE DYSFUNCTION, UNSPECIFIED ERECTILE DYSFUNCTION TYPE: ICD-10-CM

## 2023-08-09 DIAGNOSIS — G47.00 INSOMNIA, UNSPECIFIED TYPE: Primary | ICD-10-CM

## 2023-08-09 DIAGNOSIS — J45.30 MILD PERSISTENT ALLERGIC ASTHMA: ICD-10-CM

## 2023-08-09 DIAGNOSIS — F41.1 GENERALIZED ANXIETY DISORDER: ICD-10-CM

## 2023-08-09 PROCEDURE — 99214 OFFICE O/P EST MOD 30 MIN: CPT | Mod: VID | Performed by: INTERNAL MEDICINE

## 2023-08-09 RX ORDER — SILDENAFIL 100 MG/1
100 TABLET, FILM COATED ORAL DAILY PRN
Qty: 30 TABLET | Refills: 4 | Status: SHIPPED | OUTPATIENT
Start: 2023-08-09 | End: 2023-12-11

## 2023-08-09 RX ORDER — BUPROPION HYDROCHLORIDE 300 MG/1
300 TABLET ORAL EVERY MORNING
Qty: 90 TABLET | Refills: 1 | Status: SHIPPED | OUTPATIENT
Start: 2023-08-09 | End: 2023-10-30

## 2023-08-09 RX ORDER — ALBUTEROL SULFATE 0.83 MG/ML
2.5 SOLUTION RESPIRATORY (INHALATION) EVERY 6 HOURS PRN
Qty: 90 ML | Refills: 1 | Status: SHIPPED | OUTPATIENT
Start: 2023-08-09 | End: 2023-12-11

## 2023-08-09 RX ORDER — ESCITALOPRAM OXALATE 5 MG/1
5 TABLET ORAL DAILY
Qty: 90 TABLET | Refills: 1 | Status: SHIPPED | OUTPATIENT
Start: 2023-08-09 | End: 2023-10-30

## 2023-08-09 RX ORDER — FLUTICASONE PROPIONATE AND SALMETEROL XINAFOATE 115; 21 UG/1; UG/1
2 AEROSOL, METERED RESPIRATORY (INHALATION) 2 TIMES DAILY
Qty: 12 G | Refills: 5 | Status: SHIPPED | OUTPATIENT
Start: 2023-08-09 | End: 2024-09-10

## 2023-08-09 RX ORDER — RAMELTEON 8 MG/1
8 TABLET ORAL AT BEDTIME
Qty: 90 TABLET | Refills: 1 | Status: SHIPPED | OUTPATIENT
Start: 2023-08-09 | End: 2023-10-30

## 2023-08-09 RX ORDER — LEVALBUTEROL TARTRATE 45 UG/1
2 AEROSOL, METERED ORAL EVERY 4 HOURS PRN
Qty: 15 G | Refills: 1 | Status: SHIPPED | OUTPATIENT
Start: 2023-08-09 | End: 2023-12-11

## 2023-08-09 ASSESSMENT — PATIENT HEALTH QUESTIONNAIRE - PHQ9: SUM OF ALL RESPONSES TO PHQ QUESTIONS 1-9: 9
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
## 2023-08-09 NOTE — TELEPHONE ENCOUNTER
Per provider:     Please print out the DME order that I just placed for that patient and leave at the  as he is going to come later in the afternoon to pick it up      Order at

## 2023-10-28 ASSESSMENT — ANXIETY QUESTIONNAIRES
3. WORRYING TOO MUCH ABOUT DIFFERENT THINGS: SEVERAL DAYS
6. BECOMING EASILY ANNOYED OR IRRITABLE: SEVERAL DAYS
1. FEELING NERVOUS, ANXIOUS, OR ON EDGE: SEVERAL DAYS
7. FEELING AFRAID AS IF SOMETHING AWFUL MIGHT HAPPEN: SEVERAL DAYS
GAD7 TOTAL SCORE: 7
IF YOU CHECKED OFF ANY PROBLEMS ON THIS QUESTIONNAIRE, HOW DIFFICULT HAVE THESE PROBLEMS MADE IT FOR YOU TO DO YOUR WORK, TAKE CARE OF THINGS AT HOME, OR GET ALONG WITH OTHER PEOPLE: SOMEWHAT DIFFICULT
2. NOT BEING ABLE TO STOP OR CONTROL WORRYING: SEVERAL DAYS
GAD7 TOTAL SCORE: 7
5. BEING SO RESTLESS THAT IT IS HARD TO SIT STILL: SEVERAL DAYS
4. TROUBLE RELAXING: SEVERAL DAYS

## 2023-10-28 ASSESSMENT — ASTHMA QUESTIONNAIRES: ACT_TOTALSCORE: 15

## 2023-10-30 ENCOUNTER — VIRTUAL VISIT (OUTPATIENT)
Dept: FAMILY MEDICINE | Facility: CLINIC | Age: 38
End: 2023-10-30
Payer: COMMERCIAL

## 2023-10-30 DIAGNOSIS — G47.00 INSOMNIA, UNSPECIFIED TYPE: ICD-10-CM

## 2023-10-30 DIAGNOSIS — F43.23 ADJUSTMENT DISORDER WITH MIXED ANXIETY AND DEPRESSED MOOD: Primary | ICD-10-CM

## 2023-10-30 DIAGNOSIS — F41.1 GENERALIZED ANXIETY DISORDER: ICD-10-CM

## 2023-10-30 PROCEDURE — 99214 OFFICE O/P EST MOD 30 MIN: CPT | Mod: VID | Performed by: STUDENT IN AN ORGANIZED HEALTH CARE EDUCATION/TRAINING PROGRAM

## 2023-10-30 RX ORDER — BUPROPION HYDROCHLORIDE 300 MG/1
300 TABLET ORAL EVERY MORNING
Qty: 90 TABLET | Refills: 1 | Status: SHIPPED | OUTPATIENT
Start: 2023-10-30

## 2023-10-30 RX ORDER — TRAZODONE HYDROCHLORIDE 50 MG/1
50 TABLET, FILM COATED ORAL
Qty: 90 TABLET | Refills: 1 | Status: SHIPPED | OUTPATIENT
Start: 2023-10-30 | End: 2024-07-22

## 2023-10-30 RX ORDER — BUSPIRONE HYDROCHLORIDE 5 MG/1
5 TABLET ORAL DAILY
Qty: 30 TABLET | Refills: 1 | Status: SHIPPED | OUTPATIENT
Start: 2023-10-30 | End: 2023-12-11

## 2023-10-30 NOTE — PROGRESS NOTES
Shashi is a 38 year old who is being evaluated via a billable video visit.      How would you like to obtain your AVS? MyChart  If the video visit is dropped, the invitation should be resent by:   Will anyone else be joining your video visit? No          Assessment & Plan     (F43.23) Adjustment disorder with mixed anxiety and depressed mood  (primary encounter diagnosis)  Comment: Chronic, uncontrolled. Discussed aspects of side effects with lexapro as it relates to anxiety and use of wellbutrin. Did recommend patient to transition off of Lexapro and will try Buspar with Wellbutrin at this time. Pt verbalized agreement. Pt informed that if nearing 3-4 weeks and medication adjustment is requested- to send a [place] message notifying me as the provider of the medication change. Verbalized understanding   Plan: REVIEW OF HEALTH MAINTENANCE PROTOCOL ORDERS,         busPIRone (BUSPAR) 5 MG tablet, buPROPion         (WELLBUTRIN XL) 300 MG 24 hr tablet            (G47.00) Insomnia, unspecified type  Comment: Chronic, uncontrolled. Pt requesting refills of prescription   Plan: traZODone (DESYREL) 50 MG tablet            (F41.1) Generalized anxiety disorder  Comment: Chronic, uncontrolled. See above   Plan: buPROPion (WELLBUTRIN XL) 300 MG 24 hr tablet        Pending pickup of medications from pharmacy      Dictation Disclaimer: Some of this Note has been completed with voice-recognition dictation software. Although errors are generally corrected real-time, there is the potential for a rare error to be present in the completed chart.                BMI:   Estimated body mass index is 33.91 kg/m  as calculated from the following:    Height as of 2/25/23: 1.829 m (6').    Weight as of 2/25/23: 113.4 kg (250 lb).           JAE CRUZ MD  [facility]   Shashi is a 38 year old, presenting for the following health issues:   Follow Up      History of Present Illness       Mental Health  Follow-up:  Patient presents to follow-up on Depression & Anxiety.Patient's depression since last visit has been:  Medium  The patient is not having other symptoms associated with depression.  Patient's anxiety since last visit has been:  Bad  The patient is having other symptoms associated with anxiety.  Any significant life events: job concerns, financial concerns and grief or loss  Patient is feeling anxious or having panic attacks.  Patient has no concerns about alcohol or drug use.    He eats 0-1 servings of fruits and vegetables daily.He consumes 1 sweetened beverage(s) daily.He exercises with enough effort to increase his heart rate 10 to 19 minutes per day.  He exercises with enough effort to increase his heart rate 3 or less days per week. He is missing 1 dose(s) of medications per week.  He is not taking prescribed medications regularly due to side effects.           Stopped taking Ramelteon. Didn't work for him    Insomnia-  Ran out of trazodone a week ago    The patient presents via virtual visit for evaluation of anxiety.    He has been having some anxiety issues. He has been on Wellbutrin 300 mg, which has been working for him. He was on Lexapro with Wellbutrin, but it does not seem to be working well for him. He works in law enforcement. He was diagnosed with anxiety approximately 6 years ago. He lost a child and it has been rough ever since then. His anxiety has been getting better, but he has been noticing lately that he has been having a lot of anxiety. He has not been sleeping well. He has noticed weight gain with Lexapro He needs a refill on trazodone.    .    .            Review of Systems   CONSTITUTIONAL: NEGATIVE for fever, chills, change in weight  ENT/MOUTH: NEGATIVE for ear, mouth and throat problems  RESP: NEGATIVE for significant cough or SOB  CV: NEGATIVE for chest pain, palpitations or peripheral edema  PSYCHIATRIC: POSITIVE foranxiety      Objective           Vitals:  No vitals were  obtained today due to virtual visit.    Physical Exam   GENERAL: Healthy, alert and no distress  EYES: Eyes grossly normal to inspection.  No discharge or erythema, or obvious scleral/conjunctival abnormalities.  RESP: No audible wheeze, cough, or visible cyanosis.  No visible retractions or increased work of breathing.    SKIN: Visible skin clear. No significant rash, abnormal pigmentation or lesions.  NEURO: Cranial nerves grossly intact.  Mentation and speech appropriate for age.  PSYCH: Mentation appears normal, affect normal/bright, judgement and insight intact, normal speech and appearance well-groomed.    No results found for any visits on 10/30/23.            Video-Visit Details    Type of service:  Video Visit     Originating Location (pt. Location): Home    Distant Location (provider location):  Off-site  Platform used for Video Visit: [employer]

## 2023-12-11 ENCOUNTER — VIRTUAL VISIT (OUTPATIENT)
Dept: FAMILY MEDICINE | Facility: CLINIC | Age: 38
End: 2023-12-11
Payer: COMMERCIAL

## 2023-12-11 DIAGNOSIS — F43.23 ADJUSTMENT DISORDER WITH MIXED ANXIETY AND DEPRESSED MOOD: ICD-10-CM

## 2023-12-11 DIAGNOSIS — J45.30 MILD PERSISTENT ALLERGIC ASTHMA: ICD-10-CM

## 2023-12-11 DIAGNOSIS — J30.2 SEASONAL ALLERGIC RHINITIS, UNSPECIFIED TRIGGER: ICD-10-CM

## 2023-12-11 PROCEDURE — 99213 OFFICE O/P EST LOW 20 MIN: CPT | Mod: VID | Performed by: STUDENT IN AN ORGANIZED HEALTH CARE EDUCATION/TRAINING PROGRAM

## 2023-12-11 RX ORDER — BUSPIRONE HYDROCHLORIDE 5 MG/1
5 TABLET ORAL DAILY
Qty: 90 TABLET | Refills: 2 | Status: SHIPPED | OUTPATIENT
Start: 2023-12-11 | End: 2024-07-12

## 2023-12-11 RX ORDER — MONTELUKAST SODIUM 10 MG/1
10 TABLET ORAL AT BEDTIME
Qty: 90 TABLET | Refills: 1 | Status: SHIPPED | OUTPATIENT
Start: 2023-12-11

## 2023-12-11 ASSESSMENT — PATIENT HEALTH QUESTIONNAIRE - PHQ9
SUM OF ALL RESPONSES TO PHQ QUESTIONS 1-9: 2
5. POOR APPETITE OR OVEREATING: NOT AT ALL

## 2023-12-11 ASSESSMENT — ANXIETY QUESTIONNAIRES
7. FEELING AFRAID AS IF SOMETHING AWFUL MIGHT HAPPEN: NOT AT ALL
3. WORRYING TOO MUCH ABOUT DIFFERENT THINGS: NOT AT ALL
5. BEING SO RESTLESS THAT IT IS HARD TO SIT STILL: NOT AT ALL
2. NOT BEING ABLE TO STOP OR CONTROL WORRYING: NOT AT ALL
GAD7 TOTAL SCORE: 0
IF YOU CHECKED OFF ANY PROBLEMS ON THIS QUESTIONNAIRE, HOW DIFFICULT HAVE THESE PROBLEMS MADE IT FOR YOU TO DO YOUR WORK, TAKE CARE OF THINGS AT HOME, OR GET ALONG WITH OTHER PEOPLE: NOT DIFFICULT AT ALL
GAD7 TOTAL SCORE: 0
1. FEELING NERVOUS, ANXIOUS, OR ON EDGE: NOT AT ALL
6. BECOMING EASILY ANNOYED OR IRRITABLE: NOT AT ALL

## 2023-12-11 NOTE — PROGRESS NOTES
Shashi is a 38 year old who is being evaluated via a billable video visit.      How would you like to obtain your AVS? MyChart  If the video visit is dropped, the invitation should be resent by: Text to cell phone: [phone]  Will anyone else be joining your video visit? No          Assessment & Plan     (J30.2) Seasonal allergic rhinitis, unspecified trigger  Comment: Chronic, stable. Will send refills  Plan: montelukast (SINGULAIR) 10 MG tablet            (J45.30) Mild persistent allergic asthma  Comment: Chronic, stable. Will send refills  Plan: montelukast (SINGULAIR) 10 MG tablet            (F43.23) Adjustment disorder with mixed anxiety and depressed mood  Comment: Chronic, improved. Pt tolerating medication adequately and feeling better overall. Pt in agreement to follow up in 3-6 months  Plan: busPIRone (BUSPAR) 5 MG tablet            Time: 10 mins                  JAE CRUZ MD  [facility]   Shashi is a 38 year old, presenting for the following health issues:  Anxiety and Depression (/)      HPI     Depression and Anxiety Follow-Up  How are you doing with your depression since your last visit? Improved   How are you doing with your anxiety since your last visit?  Improved   Are you having other symptoms that might be associated with depression or anxiety? No  Have you had a significant life event? No   Do you have any concerns with your use of alcohol or other drugs? No    Social History     Tobacco Use    Smoking status: Never    Smokeless tobacco: Never   Vaping Use    Vaping Use: Never used   Substance Use Topics    Alcohol use: No    Drug use: No         4/7/2022     3:25 PM 1/25/2023    10:32 AM 8/9/2023    11:10 AM   PHQ   PHQ-9 Total Score 3 6 9   Q9: Thoughts of better off dead/self-harm past 2 weeks Not at all Not at all Not at all         1/25/2023    10:01 AM 8/8/2023     6:34 PM 10/28/2023     1:29 PM   NIKO-7 SCORE   Total Score 8 (mild anxiety) 5  (mild anxiety) 7 (mild anxiety)   Total Score 8 5 7         Suicide Assessment Five-step Evaluation and Treatment (SAFE-T)    How many servings of fruits and vegetables do you eat daily?  0-1  On average, how many sweetened beverages do you drink each day (Examples: soda, juice, sweet tea, etc.  Do NOT count diet or artificially sweetened beverages)?   0-1  How many days per week do you exercise enough to make your heart beat faster? 1  How many minutes a day do you exercise enough to make your heart beat faster? 60 or more  How many days per week do you miss taking your medication? 0    Pt reports that he feels like his anxiety has been more controlled. He reports that he is feeling more relaxed and being able to sleep through the night and having energy levels. He does endorse having sleepiness during the first two weeks but things have improved. He endorses that he has been able to manage his job constraints more overall.    Review of Systems   Constitutional, HEENT, cardiovascular, pulmonary, gi and gu systems are negative, except as otherwise noted.      Objective           Vitals:  No vitals were obtained today due to virtual visit.    Physical Exam   GENERAL: Healthy, alert and no distress  EYES: Eyes grossly normal to inspection.  No discharge or erythema, or obvious scleral/conjunctival abnormalities.  RESP: No audible wheeze, cough, or visible cyanosis.  No visible retractions or increased work of breathing.    SKIN: Visible skin clear. No significant rash, abnormal pigmentation or lesions.  NEURO: Cranial nerves grossly intact.  Mentation and speech appropriate for age.  PSYCH: Mentation appears normal, affect normal/bright, judgement and insight intact, normal speech and appearance well-groomed.    No results found for any visits on 12/11/23.            Video-Visit Details    Type of service:  Video Visit     Originating Location (pt. Location): Home    Distant Location (provider location):   Off-site  Platform used for Video Visit: [employer]

## 2024-06-22 ENCOUNTER — HEALTH MAINTENANCE LETTER (OUTPATIENT)
Age: 39
End: 2024-06-22

## 2024-07-11 ENCOUNTER — MYC MEDICAL ADVICE (OUTPATIENT)
Dept: FAMILY MEDICINE | Facility: CLINIC | Age: 39
End: 2024-07-11
Payer: COMMERCIAL

## 2024-07-11 ENCOUNTER — E-VISIT (OUTPATIENT)
Dept: FAMILY MEDICINE | Facility: CLINIC | Age: 39
End: 2024-07-11
Payer: COMMERCIAL

## 2024-07-11 DIAGNOSIS — F43.23 ADJUSTMENT DISORDER WITH MIXED ANXIETY AND DEPRESSED MOOD: ICD-10-CM

## 2024-07-11 PROCEDURE — 99421 OL DIG E/M SVC 5-10 MIN: CPT | Performed by: FAMILY MEDICINE

## 2024-07-11 ASSESSMENT — ANXIETY QUESTIONNAIRES
1. FEELING NERVOUS, ANXIOUS, OR ON EDGE: SEVERAL DAYS
GAD7 TOTAL SCORE: 7
5. BEING SO RESTLESS THAT IT IS HARD TO SIT STILL: SEVERAL DAYS
6. BECOMING EASILY ANNOYED OR IRRITABLE: SEVERAL DAYS
3. WORRYING TOO MUCH ABOUT DIFFERENT THINGS: SEVERAL DAYS
7. FEELING AFRAID AS IF SOMETHING AWFUL MIGHT HAPPEN: NOT AT ALL
IF YOU CHECKED OFF ANY PROBLEMS ON THIS QUESTIONNAIRE, HOW DIFFICULT HAVE THESE PROBLEMS MADE IT FOR YOU TO DO YOUR WORK, TAKE CARE OF THINGS AT HOME, OR GET ALONG WITH OTHER PEOPLE: SOMEWHAT DIFFICULT
4. TROUBLE RELAXING: MORE THAN HALF THE DAYS
7. FEELING AFRAID AS IF SOMETHING AWFUL MIGHT HAPPEN: NOT AT ALL
8. IF YOU CHECKED OFF ANY PROBLEMS, HOW DIFFICULT HAVE THESE MADE IT FOR YOU TO DO YOUR WORK, TAKE CARE OF THINGS AT HOME, OR GET ALONG WITH OTHER PEOPLE?: SOMEWHAT DIFFICULT
2. NOT BEING ABLE TO STOP OR CONTROL WORRYING: SEVERAL DAYS
GAD7 TOTAL SCORE: 7

## 2024-07-12 RX ORDER — BUSPIRONE HYDROCHLORIDE 10 MG/1
10 TABLET ORAL 2 TIMES DAILY
Qty: 180 TABLET | Refills: 3 | Status: SHIPPED | OUTPATIENT
Start: 2024-07-12

## 2024-07-12 NOTE — PATIENT INSTRUCTIONS
Thank you for choosing us for your care. I have placed an order for your treatment:   Orders Placed This Encounter   Medications     busPIRone (BUSPAR) 10 MG tablet     Sig: Take 1 tablet (10 mg) by mouth 2 times daily     Dispense:  180 tablet     Refill:  3      View your full visit summary for details by clicking on the link below. Your pharmacist will able to address any questions you may have about the medication.      If you're not feeling better within 4-6 weeks please schedule an appointment.  You can schedule an appointment right here in [facility], or call [phone]  If the visit is for the same symptoms as your eVisit, we'll refund the cost of your eVisit if seen within seven days.    Learning About Anxiety Disorders  What are anxiety disorders?     Anxiety disorders are a type of medical problem. They cause severe anxiety. When you feel anxious, you feel that something bad is about to happen. This feeling interferes with your life.  These disorders include:  Generalized anxiety disorder. You feel worried and stressed about many everyday events and activities. This goes on for several months and disrupts your life on most days.  Panic disorder. You have repeated panic attacks. A panic attack is a sudden, intense fear or anxiety. It may make you feel short of breath. Your heart may pound.  Social anxiety disorder. You feel very anxious about what you will say or do in front of people. For example, you may be scared to talk or eat in public. This problem affects your daily life.  Phobias. You are very scared of a specific object, situation, or activity. For example, you may fear spiders, high places, or small spaces.  What are the symptoms?  Generalized anxiety disorder  Symptoms may include:  Feeling worried and stressed about many things almost every day.  Feeling tired or irritable. You may have a hard time concentrating.  Having headaches or muscle aches.  Having a hard time getting to sleep or staying  asleep.  Panic disorder  You may have repeated panic attacks when there is no reason for feeling afraid. You may change your daily activities because you worry that you will have another attack.  Symptoms may include:  Intense fear, terror, or anxiety.  Trouble breathing or very fast breathing.  Chest pain or tightness.  A heartbeat that races or is not regular.  Social anxiety disorder  Symptoms may include:  Fear about a social situation, such as eating in front of others or speaking in public. You may worry a lot. Or you may be afraid that something bad will happen.  Anxiety that can cause you to blush, sweat, and feel shaky.  A heartbeat that is faster than normal.  A hard time focusing.  Phobias  Symptoms may include:  More fear than most people of being around an object, being in a situation, or doing an activity. You might also be stressed about the chance of being around the thing you fear.  Worry about losing control, panicking, fainting, or having physical symptoms like a faster heartbeat when you are around the situation or object.  How are these disorders treated?  Anxiety disorders can be treated with medicines or counseling. A combination of both may be used.  Medicines may include:  Antidepressants. These may help your symptoms by keeping chemicals in your brain in balance.  Benzodiazepines. These may give you short-term relief of your symptoms.  Some people use cognitive-behavioral therapy. A therapist helps you learn to change stressful or bad thoughts into helpful thoughts.  Lead a healthy lifestyle  A healthy lifestyle may help you feel better.  Get at least 30 minutes of exercise on most days of the week. Walking is a good choice.  Eat a healthy diet. Include fruits, vegetables, lean proteins, and whole grains in your diet each day.  Try to go to bed at the same time every night. Try for 8 hours of sleep a night.  Find ways to manage stress. Try relaxation exercises.  Avoid alcohol and illegal  "drugs.  Follow-up care is a key part of your treatment and safety. Be sure to make and go to all appointments, and call your doctor if you are having problems. It's also a good idea to know your test results and keep a list of the medicines you take.  Where can you learn more?  Go to https://www.[employer]Soft.net/patiented  Enter K667 in the search box to learn more about \"Learning About Anxiety Disorders.\"  Current as of: June 24, 2023               Content Version: 14.0    4217-4090 [employer].   Care instructions adapted under license by your healthcare professional. If you have questions about a medical condition or this instruction, always ask your healthcare professional. [employer] disclaims any warranty or liability for your use of this information.      "

## 2024-07-21 DIAGNOSIS — G47.00 INSOMNIA, UNSPECIFIED TYPE: ICD-10-CM

## 2024-07-22 RX ORDER — TRAZODONE HYDROCHLORIDE 50 MG/1
50 TABLET, FILM COATED ORAL
Qty: 90 TABLET | Refills: 0 | Status: SHIPPED | OUTPATIENT
Start: 2024-07-22

## 2024-08-06 ENCOUNTER — TELEPHONE (OUTPATIENT)
Dept: ORTHOPEDICS | Facility: CLINIC | Age: 39
End: 2024-08-06
Payer: COMMERCIAL

## 2024-08-06 NOTE — TELEPHONE ENCOUNTER
Left Voicemail (1st Attempt) and Sent Mychart (1st Attempt) for the patient to call back and schedule the following:    Appointment type: New MSK  Return date: 8/9/24 with Dr. Momin r/s to any [facility] sports provider

## 2024-08-07 NOTE — TELEPHONE ENCOUNTER
JENNAM and MyC for the patient to call back and schedule the following:     Appointment type: New MSK  Return date: 8/9/24 with Dr. Momin r/s to any [facility] sports provider

## 2024-08-07 NOTE — TELEPHONE ENCOUNTER
DIAGNOSIS: Been having extreme pain on the bottom of my left foot. Its getting worse and making it hard for me to walk.    APPOINTMENT DATE: 08/09/2024   NOTES STATUS DETAILS   OFFICE NOTE from referring provider SELF SELF REFERRED.

## 2024-08-08 NOTE — TELEPHONE ENCOUNTER
JENNAM and MyC for the patient to call back and schedule the following:     Appointment type: New MSK  Return date: 8/9/24 with Dr. Momin r/s to any [facility] sports provider. 8/9 cnacled due to MAX attempts made to r/s.

## 2024-08-09 ENCOUNTER — PRE VISIT (OUTPATIENT)
Dept: ORTHOPEDICS | Facility: CLINIC | Age: 39
End: 2024-08-09

## 2024-09-04 ENCOUNTER — OFFICE VISIT (OUTPATIENT)
Dept: ORTHOPEDICS | Facility: CLINIC | Age: 39
End: 2024-09-04
Payer: COMMERCIAL

## 2024-09-04 ENCOUNTER — TELEPHONE (OUTPATIENT)
Dept: ORTHOPEDICS | Facility: CLINIC | Age: 39
End: 2024-09-04

## 2024-09-04 ENCOUNTER — ANCILLARY PROCEDURE (OUTPATIENT)
Dept: GENERAL RADIOLOGY | Facility: CLINIC | Age: 39
End: 2024-09-04
Attending: FAMILY MEDICINE
Payer: COMMERCIAL

## 2024-09-04 VITALS — WEIGHT: 265 LBS | HEIGHT: 72 IN | BODY MASS INDEX: 35.89 KG/M2

## 2024-09-04 DIAGNOSIS — M79.672 LEFT FOOT PAIN: Primary | ICD-10-CM

## 2024-09-04 DIAGNOSIS — M79.672 LEFT FOOT PAIN: ICD-10-CM

## 2024-09-04 DIAGNOSIS — M72.2 PLANTAR FASCIITIS, LEFT: Primary | ICD-10-CM

## 2024-09-04 PROCEDURE — 73630 X-RAY EXAM OF FOOT: CPT | Mod: LT | Performed by: RADIOLOGY

## 2024-09-04 PROCEDURE — 99204 OFFICE O/P NEW MOD 45 MIN: CPT | Performed by: FAMILY MEDICINE

## 2024-09-04 RX ORDER — MELOXICAM 15 MG/1
TABLET ORAL
Qty: 30 TABLET | Refills: 1 | Status: SHIPPED | OUTPATIENT
Start: 2024-09-04

## 2024-09-04 RX ORDER — METHYLPREDNISOLONE 4 MG
TABLET, DOSE PACK ORAL
Qty: 21 TABLET | Refills: 0 | Status: SHIPPED | OUTPATIENT
Start: 2024-09-04

## 2024-09-04 NOTE — PROGRESS NOTES
HISTORY OF PRESENT ILLNESS  Mr. Yates is a 39 year old male who presents to clinic today with one year of left foot pain.  Shashi has been struggling with pain in the sole of his foot for one year. There is no event of onset and it came on gradually. It has been worsening the past six months. It is worse when he wakes up in the morning and after a long day of work (on his feet for 12 hours). He endorses occasional numbness and tingling after a day of work. Tylenol helps his pain.         Additional history: as documented    MEDICAL HISTORY  Patient Active Problem List   Diagnosis    Primary localized osteoarthrosis, lower leg    Iliotibial band syndrome    Meenakshi's disease    Knee pain    Left knee pain    Aftercare following surgery of the musculoskeletal system    Adjustment disorder with mixed anxiety and depressed mood    Streptococcal pharyngitis       Current Outpatient Medications   Medication Sig Dispense Refill    buPROPion (WELLBUTRIN XL) 300 MG 24 hr tablet Take 1 tablet (300 mg) by mouth every morning 90 tablet 1    busPIRone (BUSPAR) 10 MG tablet Take 1 tablet (10 mg) by mouth 2 times daily 180 tablet 3    CLARITIN 10 MG OR TABS ONE DAILY 31 3    fluticasone (FLONASE) 50 MCG/ACT nasal spray Spray 1 spray into both nostrils daily 16 g 3    fluticasone-salmeterol (ADVAIR HFA) 115-21 MCG/ACT inhaler Inhale 2 puffs into the lungs 2 times daily 12 g 5    montelukast (SINGULAIR) 10 MG tablet Take 1 tablet (10 mg) by mouth at bedtime 90 tablet 1    sildenafil (VIAGRA) 100 MG tablet Take 1 tablet (100 mg) by mouth daily as needed (erective dysfunction) 30 tablet 1    traZODone (DESYREL) 50 MG tablet Take 1 tablet (50 mg) by mouth nightly as needed for sleep 90 tablet 0       Allergies   Allergen Reactions    Nsaids Nausea     Other reaction(s): Nausea Only  Abdominal pain, causes redness in both eyes  Abdominal pain, causes redness in both eyes         Family History   Problem Relation Age of Onset     Diabetes Father     Asthma Mother        Additional medical/Social/Surgical histories reviewed in Psychiatric and updated as appropriate.        PHYSICAL EXAM  General  - normal appearance, in no obvious distress  Musculoskeletal - left foot  - stance: normal gait without limp  - inspection: no swelling or effusion, normal bone and joint alignment, no obvious deformity  - palpation: tender at the medial calcaneal tubercle  - ROM: normal active and passive ROM of great and lesser toes, no pain with MT translation  - strength: 5/5 in all planes  Neuro  - no sensory or motor deficit, grossly normal coordination, normal muscle tone           ASSESSMENT & PLAN  Mr. Yates is a 39 year old male who presents to clinic today with chronic L foot pain concerning for plantar fascitis.     I ordered and independently reviewed an xray of his L foot, this reveals no evidence of acute fracture.    We discussed pathophysiology and treatment strategies for plantar fasciitis in some depth.  -Super foot orthotic and ice with frozen water bottle   -After discussing with patient, will prescribe 5 day prednisone taper followed by BID Meloxicam as needed for pain  - If begins to have worsening pain, will plan to refer to podiatry for possible CSI and place in walking boot.    It was a pleasure seeing Shashi today.    Neri Carrasco DO, [facility]  Primary Care Sports Medicine      This note was constructed using Dragon dictation software, please excuse any minor errors in spelling, grammar, or syntax.

## 2024-09-04 NOTE — TELEPHONE ENCOUNTER
Oniel Of [facility], the pharmacist is calling to request clarification on the Rx from Dr. Carrasco. Need to know the dose as written it is daily for 2 weeks and then as needed.      Please resend the order or call Doug to clarify.     Could we send this information to you in [employer] or would you prefer to receive a phone call?:   [phone] ok to speak to any pharmacist their.     Thank you.

## 2024-09-04 NOTE — LETTER
9/4/2024      RE: Shashi Yates  [address]  [facility] [address]     Dear Colleague,    Thank you for referring your patient, Shashi Yates, to the [facility] [place]. Please see a copy of my visit note below.      HISTORY OF PRESENT ILLNESS  Mr. Yates is a 39 year old male who presents to clinic today with one year of left foot pain.  Shashi has been struggling with pain in the sole of his foot for one year. There is no event of onset and it came on gradually. It has been worsening the past six months. It is worse when he wakes up in the morning and after a long day of work (on his feet for 12 hours). He endorses occasional numbness and tingling after a day of work. Tylenol helps his pain.         Additional history: as documented    MEDICAL HISTORY  Patient Active Problem List   Diagnosis     Primary localized osteoarthrosis, lower leg     Iliotibial band syndrome     Meenakshi's disease     Knee pain     Left knee pain     Aftercare following surgery of the musculoskeletal system     Adjustment disorder with mixed anxiety and depressed mood     Streptococcal pharyngitis       Current Outpatient Medications   Medication Sig Dispense Refill     buPROPion (WELLBUTRIN XL) 300 MG 24 hr tablet Take 1 tablet (300 mg) by mouth every morning 90 tablet 1     busPIRone (BUSPAR) 10 MG tablet Take 1 tablet (10 mg) by mouth 2 times daily 180 tablet 3     CLARITIN 10 MG OR TABS ONE DAILY 31 3     fluticasone (FLONASE) 50 MCG/ACT nasal spray Spray 1 spray into both nostrils daily 16 g 3     fluticasone-salmeterol (ADVAIR HFA) 115-21 MCG/ACT inhaler Inhale 2 puffs into the lungs 2 times daily 12 g 5     montelukast (SINGULAIR) 10 MG tablet Take 1 tablet (10 mg) by mouth at bedtime 90 tablet 1     sildenafil (VIAGRA) 100 MG tablet Take 1 tablet (100 mg) by mouth daily as needed (erective dysfunction) 30 tablet 1     traZODone (DESYREL) 50 MG tablet Take 1 tablet (50 mg) by  mouth nightly as needed for sleep 90 tablet 0       Allergies   Allergen Reactions     Nsaids Nausea     Other reaction(s): Nausea Only  Abdominal pain, causes redness in both eyes  Abdominal pain, causes redness in both eyes         Family History   Problem Relation Age of Onset     Diabetes Father      Asthma Mother        Additional medical/Social/Surgical histories reviewed in EPIC and updated as appropriate.        PHYSICAL EXAM  General  - normal appearance, in no obvious distress  Musculoskeletal - left foot  - stance: normal gait without limp  - inspection: no swelling or effusion, normal bone and joint alignment, no obvious deformity  - palpation: tender at the medial calcaneal tubercle  - ROM: normal active and passive ROM of great and lesser toes, no pain with MT translation  - strength: 5/5 in all planes  Neuro  - no sensory or motor deficit, grossly normal coordination, normal muscle tone           ASSESSMENT & PLAN  Mr. Yates is a 39 year old male who presents to clinic today with chronic L foot pain concerning for plantar fascitis.     I ordered and independently reviewed an xray of his L foot, this reveals no evidence of acute fracture.    We discussed pathophysiology and treatment strategies for plantar fasciitis in some depth.  -Super foot orthotic and ice with frozen water bottle   -After discussing with patient, will prescribe 5 day prednisone taper followed by BID Meloxicam as needed for pain  - If begins to have worsening pain, will plan to refer to podiatry for possible CSI and place in walking boot.    It was a pleasure seeing Shashi today.    Neri Carrasco DO, CAM  Primary Care Sports Medicine      This note was constructed using Dragon dictation software, please excuse any minor errors in spelling, grammar, or syntax.      Again, thank you for allowing me to participate in the care of your patient.      Sincerely,    Neri Carrasco DO

## 2024-09-05 NOTE — TELEPHONE ENCOUNTER
"Called and spoke with Pharmascist to confirm \"1 pill daily for 2wks, PRN following\" no further assistance required.   "

## 2024-09-10 ENCOUNTER — MYC REFILL (OUTPATIENT)
Dept: FAMILY MEDICINE | Facility: CLINIC | Age: 39
End: 2024-09-10
Payer: COMMERCIAL

## 2024-09-10 DIAGNOSIS — J45.30 MILD PERSISTENT ALLERGIC ASTHMA: ICD-10-CM

## 2024-09-10 RX ORDER — FLUTICASONE PROPIONATE AND SALMETEROL XINAFOATE 115; 21 UG/1; UG/1
2 AEROSOL, METERED RESPIRATORY (INHALATION) 2 TIMES DAILY
Qty: 12 G | Refills: 5 | Status: SHIPPED | OUTPATIENT
Start: 2024-09-10

## 2024-09-13 ENCOUNTER — DOCUMENTATION ONLY (OUTPATIENT)
Dept: ORTHOPEDICS | Facility: CLINIC | Age: 39
End: 2024-09-13
Payer: COMMERCIAL

## 2024-09-13 DIAGNOSIS — M79.672 LEFT FOOT PAIN: ICD-10-CM

## 2024-09-13 DIAGNOSIS — M72.2 PLANTAR FASCIAL FIBROMATOSIS: Primary | ICD-10-CM

## 2024-11-15 ENCOUNTER — MYC REFILL (OUTPATIENT)
Dept: FAMILY MEDICINE | Facility: CLINIC | Age: 39
End: 2024-11-15
Payer: COMMERCIAL

## 2024-11-15 DIAGNOSIS — G47.00 INSOMNIA, UNSPECIFIED TYPE: ICD-10-CM

## 2024-11-15 RX ORDER — TRAZODONE HYDROCHLORIDE 50 MG/1
50 TABLET, FILM COATED ORAL
Qty: 90 TABLET | Refills: 0 | Status: SHIPPED | OUTPATIENT
Start: 2024-11-15

## 2025-01-27 ENCOUNTER — MYC REFILL (OUTPATIENT)
Dept: ORTHOPEDICS | Facility: CLINIC | Age: 40
End: 2025-01-27
Payer: COMMERCIAL

## 2025-01-27 DIAGNOSIS — M72.2 PLANTAR FASCIITIS, LEFT: ICD-10-CM

## 2025-01-27 RX ORDER — MELOXICAM 15 MG/1
TABLET ORAL
Qty: 30 TABLET | Refills: 1 | Status: SHIPPED | OUTPATIENT
Start: 2025-01-27

## 2025-03-04 ENCOUNTER — OFFICE VISIT (OUTPATIENT)
Dept: URGENT CARE | Facility: URGENT CARE | Age: 40
End: 2025-03-04
Payer: COMMERCIAL

## 2025-03-04 VITALS
DIASTOLIC BLOOD PRESSURE: 79 MMHG | BODY MASS INDEX: 41.16 KG/M2 | HEART RATE: 86 BPM | WEIGHT: 303.5 LBS | TEMPERATURE: 98.3 F | RESPIRATION RATE: 20 BRPM | OXYGEN SATURATION: 96 % | SYSTOLIC BLOOD PRESSURE: 134 MMHG

## 2025-03-04 DIAGNOSIS — J01.90 ACUTE SINUSITIS WITH COEXISTING CONDITION, NEED PROPHYLACTIC TREATMENT: Primary | ICD-10-CM

## 2025-03-04 DIAGNOSIS — G47.00 INSOMNIA, UNSPECIFIED TYPE: ICD-10-CM

## 2025-03-04 PROCEDURE — 3075F SYST BP GE 130 - 139MM HG: CPT | Performed by: PHYSICIAN ASSISTANT

## 2025-03-04 PROCEDURE — 3078F DIAST BP <80 MM HG: CPT | Performed by: PHYSICIAN ASSISTANT

## 2025-03-04 PROCEDURE — 99213 OFFICE O/P EST LOW 20 MIN: CPT | Performed by: PHYSICIAN ASSISTANT

## 2025-03-04 RX ORDER — MECLIZINE HYDROCHLORIDE 25 MG/1
25 TABLET ORAL 3 TIMES DAILY PRN
Qty: 30 TABLET | Refills: 0 | Status: SHIPPED | OUTPATIENT
Start: 2025-03-04

## 2025-03-04 RX ORDER — OMEPRAZOLE 20 MG/1
20 CAPSULE, DELAYED RELEASE ORAL
COMMUNITY
Start: 2024-11-22

## 2025-03-04 RX ORDER — ALBUTEROL SULFATE 90 UG/1
2 INHALANT RESPIRATORY (INHALATION)
COMMUNITY
Start: 2024-10-21

## 2025-03-04 RX ORDER — TRAZODONE HYDROCHLORIDE 50 MG/1
50 TABLET ORAL
Qty: 30 TABLET | Refills: 0 | Status: SHIPPED | OUTPATIENT
Start: 2025-03-04

## 2025-03-04 ASSESSMENT — ENCOUNTER SYMPTOMS
COUGH: 0
SORE THROAT: 0
SHORTNESS OF BREATH: 0
CHILLS: 0
FATIGUE: 0
FEVER: 0
NAUSEA: 0
PALPITATIONS: 0
VOMITING: 0
WHEEZING: 0
SINUS PAIN: 1
DIARRHEA: 0
HEADACHES: 1
RHINORRHEA: 1
SINUS PRESSURE: 1
CARDIOVASCULAR NEGATIVE: 1

## 2025-03-04 NOTE — PROGRESS NOTES
Subjective   Shashi is a 39 year old, presenting for the following health issues:  Otalgia (LEFT EAR PAIN ONSET YESTERDAY), Sinus Problem (SEVERE CONGESTION, HEADACHE, FACIAL PRESSURE - POSSIBLE SIMUS INFECTION), Dizziness (ONSET TODAY - WHEN STANDING AND WALKING ), and Refill Request (WOULD LIKE A REFILL ON TRAZODONE  )    HPI    Acute Illness  Acute illness concerns:   Onset/Duration: >1week  Symptoms:  Fever: No  Chills/Sweats: No  Headache (location?): YES  Sinus Pressure: YES  Conjunctivitis:  No  Ear Pain: YES: left ear pressure, dizziness  Rhinorrhea: YES  Congestion: YES  Sore Throat: No  Cough: no  Wheeze: No  Decreased Appetite: No  Nausea: No  Vomiting: No  Diarrhea: No  Dysuria/Freq.: No  Dysuria or Hematuria: No  Fatigue/Achiness: No  Sick/Strep Exposure: No  Therapies tried and outcome: rest,fluids, allergy meds, nasal spray, pseudofed with minimal relief    Patient Active Problem List   Diagnosis    Primary localized osteoarthrosis, lower leg    Iliotibial band syndrome    Burnett's disease    Knee pain    Left knee pain    Aftercare following surgery of the musculoskeletal system    Adjustment disorder with mixed anxiety and depressed mood    Streptococcal pharyngitis     Current Outpatient Medications   Medication Sig Dispense Refill    albuterol (PROAIR HFA/PROVENTIL HFA/VENTOLIN HFA) 108 (90 Base) MCG/ACT inhaler Inhale 2 puffs into the lungs.      omeprazole (PRILOSEC) 20 MG DR capsule Take 20 mg by mouth.      sildenafil (VIAGRA) 100 MG tablet Take 1 tablet (100 mg) by mouth daily as needed (erective dysfunction) 30 tablet 1    traZODone (DESYREL) 50 MG tablet Take 1 tablet (50 mg) by mouth nightly as needed for sleep. +++NEED APPOINTMENT+++ 90 tablet 0    buPROPion (WELLBUTRIN XL) 300 MG 24 hr tablet Take 1 tablet (300 mg) by mouth every morning 90 tablet 1    busPIRone (BUSPAR) 10 MG tablet Take 1 tablet (10 mg) by mouth 2 times daily 180 tablet 3    CLARITIN 10 MG OR TABS ONE DAILY 31 3     fluticasone (FLONASE) 50 MCG/ACT nasal spray Spray 1 spray into both nostrils daily 16 g 3    fluticasone-salmeterol (ADVAIR HFA) 115-21 MCG/ACT inhaler Inhale 2 puffs into the lungs 2 times daily. 12 g 5    meloxicam (MOBIC) 15 MG tablet Take daily for 2 weeks, then daily as needed 30 tablet 1    methylPREDNISolone (MEDROL DOSEPAK) 4 MG tablet therapy pack Follow Package Directions 21 tablet 0    montelukast (SINGULAIR) 10 MG tablet Take 1 tablet (10 mg) by mouth at bedtime 90 tablet 1     No current facility-administered medications for this visit.        Allergies   Allergen Reactions    Nsaids Nausea     Other reaction(s): Nausea Only  Abdominal pain, causes redness in both eyes     Review of Systems   Constitutional:  Negative for chills, fatigue and fever.   HENT:  Positive for congestion, ear pain, rhinorrhea, sinus pressure and sinus pain. Negative for sore throat.    Respiratory:  Negative for cough, shortness of breath and wheezing.    Cardiovascular: Negative.  Negative for chest pain, palpitations and leg swelling.   Gastrointestinal:  Negative for diarrhea, nausea and vomiting.   Neurological:  Positive for headaches.   All other systems reviewed and are negative.          Objective    /79 (BP Location: Left arm, Patient Position: Sitting, Cuff Size: Adult Large)   Pulse 86   Temp 98.3  F (36.8  C) (Tympanic)   Resp 20   Wt (!) 137.7 kg (303 lb 8 oz)   SpO2 96%   BMI 41.16 kg/m    Body mass index is 41.16 kg/m .  Physical Exam  Vitals and nursing note reviewed.   Constitutional:       General: He is not in acute distress.     Appearance: Normal appearance. He is normal weight. He is not ill-appearing.   HENT:      Head: Normocephalic and atraumatic.      Ears:      Comments: TMs are intact without any erythema or bulging bilaterally.  Airway is patent.     Nose: Congestion and rhinorrhea present.      Right Turbinates: Swollen.      Left Turbinates: Swollen.      Right Sinus: Maxillary sinus  tenderness and frontal sinus tenderness present.      Left Sinus: Maxillary sinus tenderness and frontal sinus tenderness present.      Mouth/Throat:      Lips: Pink.      Mouth: Mucous membranes are moist.      Pharynx: Oropharynx is clear. Uvula midline. No pharyngeal swelling, oropharyngeal exudate, posterior oropharyngeal erythema or uvula swelling.      Tonsils: No tonsillar exudate or tonsillar abscesses.   Eyes:      General: No scleral icterus.     Conjunctiva/sclera: Conjunctivae normal.      Pupils: Pupils are equal, round, and reactive to light.   Neck:      Thyroid: No thyromegaly.   Cardiovascular:      Rate and Rhythm: Normal rate and regular rhythm.      Pulses: Normal pulses.      Heart sounds: Normal heart sounds, S1 normal and S2 normal. No murmur heard.     No friction rub. No gallop.   Pulmonary:      Effort: Pulmonary effort is normal. No tachypnea, accessory muscle usage, respiratory distress or retractions.      Breath sounds: Normal breath sounds and air entry. No stridor. No decreased breath sounds, wheezing, rhonchi or rales.   Musculoskeletal:      Cervical back: Normal range of motion and neck supple.   Lymphadenopathy:      Cervical: No cervical adenopathy.   Skin:     General: Skin is warm and dry.      Findings: No rash.   Neurological:      Mental Status: He is alert and oriented to person, place, and time.   Psychiatric:         Mood and Affect: Mood normal.         Behavior: Behavior normal.         Thought Content: Thought content normal.         Judgment: Judgment normal.              Assessment/Plan:  Acute sinusitis with coexisting condition, need prophylactic treatment:  Will treat with [id number] for sinusitis and take with food/probiotics to minimize GI upset.  Will also give meclizine for dizziness. Recommend tylenol/ibuprofen prn pain/fever and zyrtec/pseudofed for sinus congestion.   Rest, fluids, chicken soup.  Recheck in clinic if symptoms worsen or if symptoms do  not improve.    -     amoxicillin-clavulanate (AUGMENTIN) 875-125 MG tablet; Take 1 tablet by mouth 2 times daily for 10 days.  -     meclizine (ANTIVERT) 25 MG tablet; Take 1 tablet (25 mg) by mouth 3 times daily as needed for dizziness.    Insomnia, unspecified type:  Will refill his trazodone [id number] and then needs to follow up with his PCP.  -     traZODone (DESYREL) 50 MG tablet; Take 1 tablet (50 mg) by mouth nightly as needed for sleep.        Randi Hess PA-C

## 2025-03-25 ENCOUNTER — VIRTUAL VISIT (OUTPATIENT)
Dept: FAMILY MEDICINE | Facility: CLINIC | Age: 40
End: 2025-03-25
Payer: COMMERCIAL

## 2025-03-25 DIAGNOSIS — J45.30 MILD PERSISTENT ALLERGIC ASTHMA: Primary | ICD-10-CM

## 2025-03-25 DIAGNOSIS — E66.813 CLASS 3 SEVERE OBESITY DUE TO EXCESS CALORIES WITH SERIOUS COMORBIDITY AND BODY MASS INDEX (BMI) OF 40.0 TO 44.9 IN ADULT (H): ICD-10-CM

## 2025-03-25 DIAGNOSIS — G47.00 INSOMNIA, UNSPECIFIED TYPE: ICD-10-CM

## 2025-03-25 DIAGNOSIS — E66.01 CLASS 3 SEVERE OBESITY DUE TO EXCESS CALORIES WITH SERIOUS COMORBIDITY AND BODY MASS INDEX (BMI) OF 40.0 TO 44.9 IN ADULT (H): ICD-10-CM

## 2025-03-25 DIAGNOSIS — F43.23 ADJUSTMENT DISORDER WITH MIXED ANXIETY AND DEPRESSED MOOD: ICD-10-CM

## 2025-03-25 PROCEDURE — 98006 SYNCH AUDIO-VIDEO EST MOD 30: CPT | Performed by: STUDENT IN AN ORGANIZED HEALTH CARE EDUCATION/TRAINING PROGRAM

## 2025-03-25 RX ORDER — VENLAFAXINE HYDROCHLORIDE 37.5 MG/1
CAPSULE, EXTENDED RELEASE ORAL
Qty: 97 CAPSULE | Refills: 0 | Status: SHIPPED | OUTPATIENT
Start: 2025-03-25 | End: 2025-05-16

## 2025-03-25 RX ORDER — PHENTERMINE HYDROCHLORIDE 15 MG/1
15 CAPSULE ORAL EVERY MORNING
Qty: 30 CAPSULE | Refills: 0 | Status: SHIPPED | OUTPATIENT
Start: 2025-03-25

## 2025-03-25 RX ORDER — FLUTICASONE PROPIONATE AND SALMETEROL 250; 50 UG/1; UG/1
1 POWDER RESPIRATORY (INHALATION) 2 TIMES DAILY
Qty: 60 EACH | Refills: 1 | Status: SHIPPED | OUTPATIENT
Start: 2025-03-25

## 2025-03-25 RX ORDER — TRAZODONE HYDROCHLORIDE 50 MG/1
50 TABLET ORAL
Qty: 60 TABLET | Refills: 1 | Status: SHIPPED | OUTPATIENT
Start: 2025-03-25

## 2025-03-25 ASSESSMENT — ANXIETY QUESTIONNAIRES
7. FEELING AFRAID AS IF SOMETHING AWFUL MIGHT HAPPEN: NOT AT ALL
7. FEELING AFRAID AS IF SOMETHING AWFUL MIGHT HAPPEN: NOT AT ALL
IF YOU CHECKED OFF ANY PROBLEMS ON THIS QUESTIONNAIRE, HOW DIFFICULT HAVE THESE PROBLEMS MADE IT FOR YOU TO DO YOUR WORK, TAKE CARE OF THINGS AT HOME, OR GET ALONG WITH OTHER PEOPLE: SOMEWHAT DIFFICULT
5. BEING SO RESTLESS THAT IT IS HARD TO SIT STILL: NOT AT ALL
GAD7 TOTAL SCORE: 4
6. BECOMING EASILY ANNOYED OR IRRITABLE: NOT AT ALL
4. TROUBLE RELAXING: SEVERAL DAYS
GAD7 TOTAL SCORE: 4
2. NOT BEING ABLE TO STOP OR CONTROL WORRYING: SEVERAL DAYS
3. WORRYING TOO MUCH ABOUT DIFFERENT THINGS: SEVERAL DAYS
8. IF YOU CHECKED OFF ANY PROBLEMS, HOW DIFFICULT HAVE THESE MADE IT FOR YOU TO DO YOUR WORK, TAKE CARE OF THINGS AT HOME, OR GET ALONG WITH OTHER PEOPLE?: SOMEWHAT DIFFICULT
GAD7 TOTAL SCORE: 4
1. FEELING NERVOUS, ANXIOUS, OR ON EDGE: SEVERAL DAYS

## 2025-03-25 ASSESSMENT — ASTHMA QUESTIONNAIRES
QUESTION_5 LAST FOUR WEEKS HOW WOULD YOU RATE YOUR ASTHMA CONTROL: SOMEWHAT CONTROLLED
QUESTION_3 LAST FOUR WEEKS HOW OFTEN DID YOUR ASTHMA SYMPTOMS (WHEEZING, COUGHING, SHORTNESS OF BREATH, CHEST TIGHTNESS OR PAIN) WAKE YOU UP AT NIGHT OR EARLIER THAN USUAL IN THE MORNING: ONCE OR TWICE
QUESTION_4 LAST FOUR WEEKS HOW OFTEN HAVE YOU USED YOUR RESCUE INHALER OR NEBULIZER MEDICATION (SUCH AS ALBUTEROL): ONE OR TWO TIMES PER DAY
QUESTION_1 LAST FOUR WEEKS HOW MUCH OF THE TIME DID YOUR ASTHMA KEEP YOU FROM GETTING AS MUCH DONE AT WORK, SCHOOL OR AT HOME: SOME OF THE TIME
EMERGENCY_ROOM_LAST_YEAR_TOTAL: ONE
QUESTION_2 LAST FOUR WEEKS HOW OFTEN HAVE YOU HAD SHORTNESS OF BREATH: ONCE A DAY
ACT_TOTALSCORE: 14

## 2025-03-25 NOTE — PROGRESS NOTES
Shashi is a 39 year old who is being evaluated via a billable video visit.    How would you like to obtain your AVS? MyChart  If the video visit is dropped, the invitation should be resent by: Text to cell phone: [phone]  Will anyone else be joining your video visit? No      Assessment & Plan     (J45.30) Mild persistent allergic asthma  (primary encounter diagnosis)  Comment: Chronic, uncontrolled. Asthma symptoms worsened, likely due to suboptimal Advair dose and weight gain.  Plan: fluticasone-salmeterol (ADVAIR) 250-50 MCG/ACT         inhaler        - Increase Advair to 250 mcg, one puff twice daily.  - Continue Singulair as prescribed.  - Consider Trelegy if no improvement.    (E66.813,  E66.01,  Z68.41) Class 3 severe obesity due to excess calories with serious comorbidity and body mass index (BMI) of 40.0 to 44.9 in adult (H)  Comment: Chronic, uncontrolled. Weight gain to 303 lbs may worsen asthma and health. Phentermine previously effective for weight loss.  Plan: phentermine 15 MG capsule          - Start phentermine 15 mg daily for one month, reassess.  - Consider increasing phentermine to 30 mg after one month if tolerated.  - Discuss potential addition of topiramate if needed.    (F43.23) Adjustment disorder with mixed anxiety and depressed mood  Comment: Chronic, uncontrolled. Increased anxiety and decreased energy. Effexor considered due to previous medication side effects.  Plan: venlafaxine (EFFEXOR XR) 37.5 MG 24 hr capsule  - Start Effexor 37.5 mg daily for 7 days, then increase to 75 mg as tolerated.  - Monitor for nausea, dry mouth, restlessness.  - Follow-up in six weeks to assess efficacy and tolerability.    (G47.00) Insomnia, unspecified type  Comment: Chronic, stable  Plan: traZODone (DESYREL) 50 MG tablet            I am utilizing AI dictation through [employer] to document the patient's history and physical examination. Please note that while the AI is designed to assist in capturing  detailed information, there may be errors in the dictation. I will review and edit the content for accuracy before finalizing.      The longitudinal plan of care for the diagnosis(es)/condition(s) as documented were addressed during this visit. Due to the added complexity in care, I will continue to support Shashi in the subsequent management and with ongoing continuity of care.              BMI  Estimated body mass index is 41.16 kg/m  as calculated from the following:    Height as of 9/4/24: 1.829 m (6').    Weight as of 3/4/25: 137.7 kg (303 lb 8 oz).   Weight management plan: Specific weight management program called phentermine discussed          Subjective   Shashi is a 39 year old, presenting for the following health issues:  Recheck Medication and Weight Loss    History of Present Illness       Mental Health Follow-up:  Patient presents to follow-up on Anxiety.    Patient's anxiety since last visit has been:  Medium  The patient is not having other symptoms associated with anxiety.  Any significant life events: grief or loss  Patient is feeling anxious or having panic attacks.  Patient has no concerns about alcohol or drug use.    Reason for visit:  Asthma weight gain anxiety medication    He eats 0-1 servings of fruits and vegetables daily.He consumes 1 sweetened beverage(s) daily.He exercises with enough effort to increase his heart rate 9 or less minutes per day.  He exercises with enough effort to increase his heart rate 3 or less days per week.   He is taking medications regularly.      History of Present Illness  Shashi Yates is a 39 year old male with asthma and anxiety who presents with worsening asthma symptoms and medication side effects.    He has been experiencing worsening asthma symptoms, feeling that his current dose of Advair is not effective. He experiences difficulty breathing despite using Advair at a dose of 115 mcg, one puff twice a day, and continues to take Singulair.    He stopped  taking Wellbutrin due to side effects that affected his work performance, including lethargy, dizziness, and difficulty focusing, especially after increasing the dose in combination with Buspar. He feels less energetic and unable to maintain his usual level of physical activity, contributing to weight gain.    He describes changes in his mood, noting a lack of interest in activities he usually enjoys. No depression, but persistent anxiety and reduced energy levels are present. He has tried Lexapro and Prozac in the past, which made him feel 'funny', and found Wellbutrin to be the most effective until the recent side effects.    He has gained weight, currently weighing 303 pounds as of March 4th. He attributes some of the weight gain to long work hours and difficulty maintaining a regular exercise routine. He has tried to improve his diet by reducing fast food intake. He recalls that phentermine was effective for weight loss about ten years ago.    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 He has been using trazodone for sleep, which he finds effective without causing excessive drowsiness. He has also tried hydroxyzine, which made him feel excessively drowsy.          3/25/2025   Asthma   1.  In the past 4 weeks, how much of the time did your asthma keep you from getting as much done at work, school or at home? 3   2.  During the past 4 weeks, how often have you had shortness of breath? 2   3.  During the past 4 weeks, how often did your asthma symptoms (wheezing, coughing, shortness of breath, chest tightness or pain) wake you up at night or earlier than usual in the morning? 4   4.  During the past 4 weeks, how often have you used your rescue inhaler or nebulizer medication (such as albuterol)? 2   5.  How would you rate your asthma control during the past 4 weeks? 3   ACT TOTAL SCORE (Goal Greater than or Equal to 20) 14    In the  past 12 months, how many times did you visit the emergency room for your asthma without being admitted to the hospital? 1   In the past 12 months, how many times were you hospitalized overnight because of your asthma? 0   Do you have a cough, wheezing or shortness of breath? None of these symptoms (Cough, Wheezing, Shortness of breath)   What makes your asthma/breathing worse? Smoke    Dust mites    Pollens    Animal dander    Insects/rodents    Mold    Humidity    Cold air    Gastric reflux   Do you want more information about how to use your inhaler? No       Patient-reported    Multiple values from one day are sorted in reverse-chronological order                ROS: 10 point ROS neg other than the symptoms noted above in the HPI.        Objective           Vitals:  No vitals were obtained today due to virtual visit.    Physical Exam   GENERAL: alert and no distress  EYES: Eyes grossly normal to inspection.  No discharge or erythema, or obvious scleral/conjunctival abnormalities.  RESP: No audible wheeze, cough, or visible cyanosis.    SKIN: Visible skin clear. No significant rash, abnormal pigmentation or lesions.  NEURO: Cranial nerves grossly intact.  Mentation and speech appropriate for age.  PSYCH: Appropriate affect, tone, and pace of words          Video-Visit Details    Type of service:  Video Visit   Originating Location (pt. Location): Home    Distant Location (provider location):  On-site  Platform used for Video Visit: Kong  Signed Electronically by: JAE HARE MD

## 2025-04-11 PROBLEM — J45.30 MILD PERSISTENT ASTHMA: Status: ACTIVE | Noted: 2025-04-11

## 2025-04-11 PROBLEM — Z98.84 H/O GASTRIC BYPASS: Status: ACTIVE | Noted: 2018-02-22

## 2025-04-11 PROBLEM — F43.22 ADJUSTMENT DISORDER WITH ANXIOUS MOOD: Status: ACTIVE | Noted: 2025-04-11

## 2025-05-06 ENCOUNTER — VIRTUAL VISIT (OUTPATIENT)
Dept: FAMILY MEDICINE | Facility: CLINIC | Age: 40
End: 2025-05-06
Payer: COMMERCIAL

## 2025-05-06 DIAGNOSIS — M72.2 PLANTAR FASCIITIS, LEFT: ICD-10-CM

## 2025-05-06 DIAGNOSIS — J45.30 MILD PERSISTENT ALLERGIC ASTHMA: ICD-10-CM

## 2025-05-06 DIAGNOSIS — F43.23 ADJUSTMENT DISORDER WITH MIXED ANXIETY AND DEPRESSED MOOD: Primary | ICD-10-CM

## 2025-05-06 DIAGNOSIS — E66.813 CLASS 3 SEVERE OBESITY DUE TO EXCESS CALORIES WITH SERIOUS COMORBIDITY AND BODY MASS INDEX (BMI) OF 40.0 TO 44.9 IN ADULT (H): ICD-10-CM

## 2025-05-06 DIAGNOSIS — J30.2 SEASONAL ALLERGIC RHINITIS, UNSPECIFIED TRIGGER: ICD-10-CM

## 2025-05-06 PROCEDURE — 98006 SYNCH AUDIO-VIDEO EST MOD 30: CPT | Performed by: STUDENT IN AN ORGANIZED HEALTH CARE EDUCATION/TRAINING PROGRAM

## 2025-05-06 RX ORDER — PHENTERMINE HYDROCHLORIDE 15 MG/1
15 CAPSULE ORAL EVERY MORNING
Qty: 30 CAPSULE | Refills: 0 | Status: CANCELLED | OUTPATIENT
Start: 2025-05-06

## 2025-05-06 RX ORDER — MONTELUKAST SODIUM 10 MG/1
10 TABLET ORAL AT BEDTIME
Qty: 90 TABLET | Refills: 1 | Status: SHIPPED | OUTPATIENT
Start: 2025-05-06

## 2025-05-06 RX ORDER — PHENTERMINE HYDROCHLORIDE 30 MG/1
30 CAPSULE ORAL EVERY MORNING
Qty: 30 CAPSULE | Refills: 0 | Status: SHIPPED | OUTPATIENT
Start: 2025-05-06

## 2025-05-06 RX ORDER — MELOXICAM 15 MG/1
TABLET ORAL
Qty: 30 TABLET | Refills: 1 | Status: SHIPPED | OUTPATIENT
Start: 2025-05-06

## 2025-05-06 RX ORDER — FLUTICASONE PROPIONATE AND SALMETEROL 250; 50 UG/1; UG/1
1 POWDER RESPIRATORY (INHALATION) 2 TIMES DAILY
Qty: 60 EACH | Refills: 1 | Status: SHIPPED | OUTPATIENT
Start: 2025-05-06

## 2025-05-06 ASSESSMENT — ASTHMA QUESTIONNAIRES
ACT_TOTALSCORE: 17
QUESTION_3 LAST FOUR WEEKS HOW OFTEN DID YOUR ASTHMA SYMPTOMS (WHEEZING, COUGHING, SHORTNESS OF BREATH, CHEST TIGHTNESS OR PAIN) WAKE YOU UP AT NIGHT OR EARLIER THAN USUAL IN THE MORNING: ONCE OR TWICE
QUESTION_1 LAST FOUR WEEKS HOW MUCH OF THE TIME DID YOUR ASTHMA KEEP YOU FROM GETTING AS MUCH DONE AT WORK, SCHOOL OR AT HOME: A LITTLE OF THE TIME
QUESTION_2 LAST FOUR WEEKS HOW OFTEN HAVE YOU HAD SHORTNESS OF BREATH: THREE TO SIX TIMES A WEEK
QUESTION_5 LAST FOUR WEEKS HOW WOULD YOU RATE YOUR ASTHMA CONTROL: SOMEWHAT CONTROLLED
QUESTION_4 LAST FOUR WEEKS HOW OFTEN HAVE YOU USED YOUR RESCUE INHALER OR NEBULIZER MEDICATION (SUCH AS ALBUTEROL): TWO OR THREE TIMES PER WEEK

## 2025-05-06 NOTE — PROGRESS NOTES
Shashi is a 39 year old who is being evaluated via a billable video visit.    How would you like to obtain your AVS? MyChart  If the video visit is dropped, the invitation should be resent by: Text to cell phone: [phone]  Will anyone else be joining your video visit? No      Assessment & Plan     (F43.23) Adjustment disorder with mixed anxiety and depressed mood  (primary encounter diagnosis)  Comment: Chronic, stable. Pt encouraged to continue to use therapy as it is helping his overall mood  Plan: REVIEW OF HEALTH MAINTENANCE PROTOCOL ORDERS            (J45.30) Mild persistent allergic asthma  Comment: Chronic, stable  Plan: REVIEW OF HEALTH MAINTENANCE PROTOCOL ORDERS,         fluticasone-salmeterol (ADVAIR) 250-50 MCG/ACT         inhaler, montelukast (SINGULAIR) 10 MG tablet            (M72.2) Plantar fasciitis, left  Comment: Chronic, stable  Plan: REVIEW OF HEALTH MAINTENANCE PROTOCOL ORDERS,         meloxicam (MOBIC) 15 MG tablet            (E66.813,  Z68.41) Class 3 severe obesity due to excess calories with serious comorbidity and body mass index (BMI) of 40.0 to 44.9 in adult (H)  Comment: Chronic, uncontrolled. Will increase to dose 30 mg. Pt to notify when ready to increases to 37.5 mg dose  Plan: REVIEW OF HEALTH MAINTENANCE PROTOCOL ORDERS,         phentermine 30 MG capsule            (J30.2) Seasonal allergic rhinitis, unspecified trigger  Comment: Chronic, stable  Plan: REVIEW OF HEALTH MAINTENANCE PROTOCOL ORDERS,         montelukast (SINGULAIR) 10 MG tablet            I am utilizing AI dictation through [employer] to document the patient's history and physical examination. Please note that while the AI is designed to assist in capturing detailed information, there may be errors in the dictation. I will review and edit the content for accuracy before finalizing.      The longitudinal plan of care for the diagnosis(es)/condition(s) as documented were addressed during this visit. Due to the added  complexity in care, I will continue to support Shashi in the subsequent management and with ongoing continuity of care.                  Subjective   Shashi is a 39 year old, presenting for the following health issues:  Weight Check and Asthma      5/6/2025     4:06 PM   Additional Questions   Roomed by Mala SANCHEZ CMA   Accompanied by Self         5/6/2025     4:06 PM   Patient Reported Additional Medications   Patient reports taking the following new medications None     History of Present Illness       Reason for visit:  Asthma and weight medication    He eats 0-1 servings of fruits and vegetables daily.He consumes 1 sweetened beverage(s) daily.He exercises with enough effort to increase his heart rate 20 to 29 minutes per day.  He exercises with enough effort to increase his heart rate 3 or less days per week.   He is taking medications regularly.            5/6/2025   Asthma   1.  In the past 4 weeks, how much of the time did your asthma keep you from getting as much done at work, school or at home? 4   2.  During the past 4 weeks, how often have you had shortness of breath? 3   3.  During the past 4 weeks, how often did your asthma symptoms (wheezing, coughing, shortness of breath, chest tightness or pain) wake you up at night or earlier than usual in the morning? 4   4.  During the past 4 weeks, how often have you used your rescue inhaler or nebulizer medication (such as albuterol)? 3   5.  How would you rate your asthma control during the past 4 weeks? 3   ACT TOTAL SCORE (Goal Greater than or Equal to 20) 17    In the past 12 months, how many times did you visit the emergency room for your asthma without being admitted to the hospital? 0   In the past 12 months, how many times were you hospitalized overnight because of your asthma? 0   Do you have a cough, wheezing or shortness of breath? None of these symptoms (Cough, Wheezing, Shortness of breath)   What makes your asthma/breathing worse? Smoke    Upper  respiratory illness    Dust mites    Pollens    Animal dander    Insects/rodents    Mold    Humidity    Aspirin    Strong odors and fumes    Occupational exposure    Exercise or sports    Emotions    Cold air    Gastric reflux   Do you want more information about how to use your inhaler? No       Patient-reported    Multiple values from one day are sorted in reverse-chronological order     History of Present Illness    Patrick    Asthma  Has been experiencing normal breathing and feels good with the current treatment. Reports that the current medication regimen, including Mobic, is effective in managing symptoms and reducing inflammation. No heart palpitations reported, but experiences occasional sweating, which is not a significant concern. Previously experienced difficulty breathing, but the current treatment has improved breathing and increased energy levels. Has chosen to pursue therapy through his job instead of other treatments due to concerns about potential side effects.    Appetite and Energy  Reports that the current treatment is helping with appetite and providing more energy. No negative side effects from the current medications, except for occasional sweating.    Weight Management  Reports feeling that he is starting to fit into his pants better, indicating some weight loss. Expresses interest in increasing the dosage of phentermine to 37.5 mg to continue progress with weight management.                       ROS: 10 point ROS neg other than the symptoms noted above in the HPI.        Objective           Vitals:  No vitals were obtained today due to virtual visit.    Physical Exam   GENERAL: alert and no distress  EYES: Eyes grossly normal to inspection.  No discharge or erythema, or obvious scleral/conjunctival abnormalities.  RESP: No audible wheeze, cough, or visible cyanosis.    SKIN: Visible skin clear. No significant rash, abnormal pigmentation or lesions.  NEURO: Cranial nerves grossly intact.   Mentation and speech appropriate for age.  PSYCH: Appropriate affect, tone, and pace of words          Video-Visit Details    Type of service:  Video Visit   Originating Location (pt. Location): Home    Distant Location (provider location):  On-site  Platform used for Video Visit: [employer]  Signed Electronically by: JAE HARE MD

## 2025-05-07 ENCOUNTER — TELEPHONE (OUTPATIENT)
Dept: FAMILY MEDICINE | Facility: CLINIC | Age: 40
End: 2025-05-07
Payer: COMMERCIAL

## 2025-05-07 NOTE — TELEPHONE ENCOUNTER
PRIOR AUTHORIZATION DENIED    Medication: PHENTERMINE HCL 30 MG PO CAPS  Insurance Company: [employer] - Phone [phone] Fax [phone]  Denial Date: 5/7/2025  Denial Reason(s):     Appeal Information:     Patient Notified: No

## 2025-07-07 ENCOUNTER — MYC MEDICAL ADVICE (OUTPATIENT)
Dept: FAMILY MEDICINE | Facility: CLINIC | Age: 40
End: 2025-07-07
Payer: COMMERCIAL

## 2025-07-07 DIAGNOSIS — E66.813 CLASS 3 SEVERE OBESITY DUE TO EXCESS CALORIES WITH SERIOUS COMORBIDITY AND BODY MASS INDEX (BMI) OF 40.0 TO 44.9 IN ADULT (H): Primary | ICD-10-CM

## 2025-07-07 RX ORDER — PHENTERMINE HYDROCHLORIDE 37.5 MG/1
37.5 CAPSULE ORAL EVERY MORNING
Qty: 30 CAPSULE | Refills: 2 | Status: SHIPPED | OUTPATIENT
Start: 2025-07-07

## 2025-07-12 ENCOUNTER — HEALTH MAINTENANCE LETTER (OUTPATIENT)
Age: 40
End: 2025-07-12